# Patient Record
Sex: FEMALE | Race: BLACK OR AFRICAN AMERICAN | HISPANIC OR LATINO
[De-identification: names, ages, dates, MRNs, and addresses within clinical notes are randomized per-mention and may not be internally consistent; named-entity substitution may affect disease eponyms.]

---

## 2017-01-03 ENCOUNTER — APPOINTMENT (OUTPATIENT)
Dept: ANTEPARTUM | Facility: CLINIC | Age: 27
End: 2017-01-03

## 2017-01-03 ENCOUNTER — APPOINTMENT (OUTPATIENT)
Dept: OBGYN | Facility: CLINIC | Age: 27
End: 2017-01-03

## 2017-01-03 VITALS — WEIGHT: 268.02 LBS | BODY MASS INDEX: 49.32 KG/M2 | HEIGHT: 62 IN

## 2017-01-03 VITALS — DIASTOLIC BLOOD PRESSURE: 68 MMHG | BODY MASS INDEX: 49.02 KG/M2 | WEIGHT: 268 LBS | SYSTOLIC BLOOD PRESSURE: 114 MMHG

## 2017-01-03 VITALS — HEART RATE: 90 BPM | OXYGEN SATURATION: 100 %

## 2017-01-03 LAB
BILIRUB UR QL STRIP: NEGATIVE
CLARITY UR: CLEAR
COLLECTION METHOD: NORMAL
GLUCOSE UR-MCNC: NEGATIVE
HCG UR QL: 0.2 EU/DL
HGB UR QL STRIP.AUTO: NEGATIVE
KETONES UR-MCNC: NEGATIVE
LEUKOCYTE ESTERASE UR QL STRIP: NEGATIVE
NITRITE UR QL STRIP: NEGATIVE
PH UR STRIP: 6
PROT UR STRIP-MCNC: NEGATIVE
SP GR UR STRIP: 1.02

## 2017-01-10 ENCOUNTER — APPOINTMENT (OUTPATIENT)
Dept: OBGYN | Facility: CLINIC | Age: 27
End: 2017-01-10

## 2017-01-25 ENCOUNTER — APPOINTMENT (OUTPATIENT)
Dept: ANTEPARTUM | Facility: CLINIC | Age: 27
End: 2017-01-25

## 2017-01-25 VITALS — WEIGHT: 266.8 LBS | DIASTOLIC BLOOD PRESSURE: 74 MMHG | BODY MASS INDEX: 48.8 KG/M2 | SYSTOLIC BLOOD PRESSURE: 118 MMHG

## 2017-01-25 VITALS — TEMPERATURE: 97.7 F | HEART RATE: 82 BPM | OXYGEN SATURATION: 100 %

## 2017-01-25 LAB
BILIRUB UR QL STRIP: NEGATIVE
CLARITY UR: CLEAR
COLLECTION METHOD: NORMAL
GLUCOSE UR-MCNC: NEGATIVE
HCG UR QL: 0.2 EU/DL
HGB UR QL STRIP.AUTO: NEGATIVE
KETONES UR-MCNC: NORMAL
LEUKOCYTE ESTERASE UR QL STRIP: NEGATIVE
NITRITE UR QL STRIP: NEGATIVE
PH UR STRIP: 6
PROT UR STRIP-MCNC: NEGATIVE
SP GR UR STRIP: 1.01

## 2017-02-01 ENCOUNTER — APPOINTMENT (OUTPATIENT)
Dept: ANTEPARTUM | Facility: CLINIC | Age: 27
End: 2017-02-01

## 2017-02-01 VITALS — OXYGEN SATURATION: 99 % | TEMPERATURE: 97.9 F | HEART RATE: 84 BPM

## 2017-02-01 VITALS — WEIGHT: 263.1 LBS | BODY MASS INDEX: 48.12 KG/M2 | SYSTOLIC BLOOD PRESSURE: 120 MMHG | DIASTOLIC BLOOD PRESSURE: 72 MMHG

## 2017-02-01 LAB
BILIRUB UR QL STRIP: NEGATIVE
CLARITY UR: CLEAR
COLLECTION METHOD: NORMAL
GLUCOSE UR-MCNC: NEGATIVE
HGB UR QL STRIP.AUTO: NEGATIVE
KETONES UR-MCNC: NORMAL
LEUKOCYTE ESTERASE UR QL STRIP: NEGATIVE
NITRITE UR QL STRIP: NEGATIVE
PH UR STRIP: 6.5
SP GR UR STRIP: 1.02

## 2017-02-08 ENCOUNTER — APPOINTMENT (OUTPATIENT)
Age: 27
End: 2017-02-08

## 2017-02-08 ENCOUNTER — OUTPATIENT (OUTPATIENT)
Dept: OUTPATIENT SERVICES | Facility: HOSPITAL | Age: 27
LOS: 1 days | Discharge: HOME | End: 2017-02-08

## 2017-02-08 VITALS
BODY MASS INDEX: 48.29 KG/M2 | SYSTOLIC BLOOD PRESSURE: 108 MMHG | OXYGEN SATURATION: 99 % | TEMPERATURE: 97.7 F | DIASTOLIC BLOOD PRESSURE: 74 MMHG | WEIGHT: 264 LBS | HEART RATE: 86 BPM

## 2017-02-08 VITALS — BODY MASS INDEX: 48.29 KG/M2 | SYSTOLIC BLOOD PRESSURE: 108 MMHG | DIASTOLIC BLOOD PRESSURE: 74 MMHG | WEIGHT: 264 LBS

## 2017-02-08 LAB
BILIRUB UR QL STRIP: NEGATIVE
CLARITY UR: CLEAR
COLLECTION METHOD: NORMAL
GLUCOSE UR-MCNC: NEGATIVE
HCG UR QL: 0.2 EU/DL
HGB UR QL STRIP.AUTO: NEGATIVE
KETONES UR-MCNC: NEGATIVE
LEUKOCYTE ESTERASE UR QL STRIP: NEGATIVE
NITRITE UR QL STRIP: NEGATIVE
PH UR STRIP: 7
PROT UR STRIP-MCNC: NEGATIVE
SP GR UR STRIP: 1.02

## 2017-02-15 ENCOUNTER — APPOINTMENT (OUTPATIENT)
Dept: ANTEPARTUM | Facility: CLINIC | Age: 27
End: 2017-02-15

## 2017-02-15 VITALS — OXYGEN SATURATION: 100 % | TEMPERATURE: 98.4 F | HEART RATE: 88 BPM

## 2017-02-15 VITALS — BODY MASS INDEX: 48.65 KG/M2 | SYSTOLIC BLOOD PRESSURE: 124 MMHG | DIASTOLIC BLOOD PRESSURE: 80 MMHG | WEIGHT: 266 LBS

## 2017-02-15 LAB
BILIRUB UR QL STRIP: NEGATIVE
CLARITY UR: CLEAR
COLLECTION METHOD: NORMAL
GLUCOSE UR-MCNC: NORMAL
HCG UR QL: 0.2 EU/DL
HGB UR QL STRIP.AUTO: NEGATIVE
KETONES UR-MCNC: NEGATIVE
LEUKOCYTE ESTERASE UR QL STRIP: NEGATIVE
NITRITE UR QL STRIP: NEGATIVE
PH UR STRIP: 6.5
PROT UR STRIP-MCNC: NEGATIVE
SP GR UR STRIP: 1.02

## 2017-02-16 LAB
BASOPHILS # BLD: 0.01 TH/MM3
BASOPHILS NFR BLD: 0.2 %
EOSINOPHIL # BLD: 0.1 TH/MM3
EOSINOPHIL NFR BLD: 2.3 %
ERYTHROCYTE [DISTWIDTH] IN BLOOD BY AUTOMATED COUNT: 12.4 %
ESTIMATED AVERGAGE GLUCOSE (NORTH): 97 MG/DL
GLUCOSE 1H P GLC SERPL-MCNC: 87 MG/DL
GRANULOCYTES # BLD: 2.72 TH/MM3
GRANULOCYTES NFR BLD: 61.2 %
HBA1C MFR BLD: 5 %
HCT VFR BLD AUTO: 33.3 %
HGB BLD-MCNC: 10.7 G/DL
HIV1+2 AB SPEC QL IA.RAPID: NONREACTIVE
IMM GRANULOCYTES # BLD: 0.02 TH/MM3
IMM GRANULOCYTES NFR BLD: 0.5 %
LYMPHOCYTES # BLD: 1.11 TH/MM3
LYMPHOCYTES NFR BLD: 25 %
MCH RBC QN AUTO: 28.5 PG
MCHC RBC AUTO-ENTMCNC: 32.1 G/DL
MCV RBC AUTO: 88.8 FL
MONOCYTES # BLD: 0.48 TH/MM3
MONOCYTES NFR BLD: 10.8 %
PLATELET # BLD: 160 TH/MM3
PMV BLD AUTO: 11.6 FL
RBC # BLD AUTO: 3.75 MIL/MM3
WBC # BLD: 4.44 TH/MM3

## 2017-02-22 ENCOUNTER — APPOINTMENT (OUTPATIENT)
Dept: ANTEPARTUM | Facility: CLINIC | Age: 27
End: 2017-02-22

## 2017-02-22 VITALS — SYSTOLIC BLOOD PRESSURE: 124 MMHG | DIASTOLIC BLOOD PRESSURE: 76 MMHG | WEIGHT: 254.6 LBS | BODY MASS INDEX: 46.57 KG/M2

## 2017-02-22 VITALS — HEART RATE: 87 BPM | OXYGEN SATURATION: 100 % | TEMPERATURE: 97.6 F

## 2017-02-22 LAB
BILIRUB UR QL STRIP: NEGATIVE
CLARITY UR: CLEAR
COLLECTION METHOD: NORMAL
GLUCOSE UR-MCNC: NEGATIVE
HCG UR QL: 0.2 EU/DL
HGB UR QL STRIP.AUTO: NEGATIVE
KETONES UR-MCNC: NORMAL
LEUKOCYTE ESTERASE UR QL STRIP: NEGATIVE
NITRITE UR QL STRIP: NEGATIVE
PH UR STRIP: 6.5
PROT UR STRIP-MCNC: 30
SP GR UR STRIP: 1.02

## 2017-03-01 ENCOUNTER — APPOINTMENT (OUTPATIENT)
Dept: ANTEPARTUM | Facility: CLINIC | Age: 27
End: 2017-03-01

## 2017-03-01 VITALS — BODY MASS INDEX: 47.92 KG/M2 | DIASTOLIC BLOOD PRESSURE: 70 MMHG | SYSTOLIC BLOOD PRESSURE: 114 MMHG | WEIGHT: 262 LBS

## 2017-03-01 VITALS — HEART RATE: 90 BPM | TEMPERATURE: 97 F | OXYGEN SATURATION: 100 %

## 2017-03-08 ENCOUNTER — APPOINTMENT (OUTPATIENT)
Dept: ANTEPARTUM | Facility: CLINIC | Age: 27
End: 2017-03-08

## 2017-03-08 VITALS
DIASTOLIC BLOOD PRESSURE: 70 MMHG | TEMPERATURE: 98 F | HEART RATE: 88 BPM | BODY MASS INDEX: 48.65 KG/M2 | OXYGEN SATURATION: 100 % | WEIGHT: 266 LBS | SYSTOLIC BLOOD PRESSURE: 120 MMHG

## 2017-03-08 LAB
BILIRUB UR QL STRIP: NEGATIVE
CLARITY UR: CLEAR
COLLECTION METHOD: NORMAL
GLUCOSE UR-MCNC: NEGATIVE
HCG UR QL: 0.2 EU/DL
HGB UR QL STRIP.AUTO: NEGATIVE
KETONES UR-MCNC: NEGATIVE
LEUKOCYTE ESTERASE UR QL STRIP: NEGATIVE
NITRITE UR QL STRIP: NEGATIVE
PH UR STRIP: 8
PROT UR STRIP-MCNC: NEGATIVE
SP GR UR STRIP: 1.02

## 2017-03-15 ENCOUNTER — APPOINTMENT (OUTPATIENT)
Dept: ANTEPARTUM | Facility: CLINIC | Age: 27
End: 2017-03-15

## 2017-03-15 VITALS — OXYGEN SATURATION: 100 % | HEART RATE: 90 BPM | TEMPERATURE: 97 F

## 2017-03-15 VITALS — SYSTOLIC BLOOD PRESSURE: 120 MMHG | BODY MASS INDEX: 48.84 KG/M2 | DIASTOLIC BLOOD PRESSURE: 70 MMHG | WEIGHT: 267 LBS

## 2017-03-15 LAB
BILIRUB UR QL STRIP: NEGATIVE
CLARITY UR: CLEAR
COLLECTION METHOD: NORMAL
GLUCOSE UR-MCNC: NEGATIVE
HCG UR QL: 0.2 EU/DL
HGB UR QL STRIP.AUTO: NEGATIVE
KETONES UR-MCNC: NEGATIVE
LEUKOCYTE ESTERASE UR QL STRIP: NEGATIVE
NITRITE UR QL STRIP: NEGATIVE
PH UR STRIP: 6.5
PROT UR STRIP-MCNC: NEGATIVE
SP GR UR STRIP: 1.02

## 2017-03-17 LAB
GP B STREP DNA SPEC QL NAA+PROBE: NORMAL
S PYO DNA THROAT QL NAA+PROBE: NOT DETECTED
SPECIMEN SOURCE: NORMAL

## 2017-03-22 ENCOUNTER — APPOINTMENT (OUTPATIENT)
Dept: ANTEPARTUM | Facility: CLINIC | Age: 27
End: 2017-03-22

## 2017-03-22 VITALS — HEART RATE: 88 BPM | TEMPERATURE: 98.8 F | OXYGEN SATURATION: 100 %

## 2017-03-22 VITALS — SYSTOLIC BLOOD PRESSURE: 122 MMHG | DIASTOLIC BLOOD PRESSURE: 76 MMHG | BODY MASS INDEX: 48.32 KG/M2 | WEIGHT: 264.2 LBS

## 2017-03-22 LAB
BILIRUB UR QL STRIP: NEGATIVE
CLARITY UR: CLEAR
COLLECTION METHOD: NORMAL
GLUCOSE UR-MCNC: NEGATIVE
HCG UR QL: 0.2 EU/DL
HGB UR QL STRIP.AUTO: NEGATIVE
KETONES UR-MCNC: NEGATIVE
LEUKOCYTE ESTERASE UR QL STRIP: NEGATIVE
NITRITE UR QL STRIP: NEGATIVE
PH UR STRIP: 6.5
PROT UR STRIP-MCNC: NORMAL
SP GR UR STRIP: 1.02

## 2017-04-04 ENCOUNTER — APPOINTMENT (OUTPATIENT)
Dept: ANTEPARTUM | Facility: CLINIC | Age: 27
End: 2017-04-04

## 2017-04-04 VITALS — SYSTOLIC BLOOD PRESSURE: 124 MMHG | DIASTOLIC BLOOD PRESSURE: 90 MMHG | TEMPERATURE: 98.9 F

## 2017-04-11 ENCOUNTER — APPOINTMENT (OUTPATIENT)
Dept: ANTEPARTUM | Facility: CLINIC | Age: 27
End: 2017-04-11

## 2017-04-11 ENCOUNTER — APPOINTMENT (OUTPATIENT)
Dept: OBGYN | Facility: CLINIC | Age: 27
End: 2017-04-11

## 2017-05-01 ENCOUNTER — APPOINTMENT (OUTPATIENT)
Dept: ANTEPARTUM | Facility: CLINIC | Age: 27
End: 2017-05-01

## 2017-05-08 ENCOUNTER — OUTPATIENT (OUTPATIENT)
Dept: OUTPATIENT SERVICES | Facility: HOSPITAL | Age: 27
LOS: 1 days | Discharge: HOME | End: 2017-05-08

## 2017-05-08 ENCOUNTER — APPOINTMENT (OUTPATIENT)
Dept: ANTEPARTUM | Facility: CLINIC | Age: 27
End: 2017-05-08

## 2017-05-08 VITALS
HEART RATE: 65 BPM | BODY MASS INDEX: 46.38 KG/M2 | RESPIRATION RATE: 17 BRPM | OXYGEN SATURATION: 100 % | SYSTOLIC BLOOD PRESSURE: 120 MMHG | TEMPERATURE: 97.9 F | WEIGHT: 252 LBS | DIASTOLIC BLOOD PRESSURE: 85 MMHG | HEIGHT: 62 IN

## 2017-05-12 ENCOUNTER — APPOINTMENT (OUTPATIENT)
Dept: OBGYN | Facility: CLINIC | Age: 27
End: 2017-05-12

## 2017-06-02 ENCOUNTER — APPOINTMENT (OUTPATIENT)
Dept: OBGYN | Facility: CLINIC | Age: 27
End: 2017-06-02

## 2017-06-28 DIAGNOSIS — M32.9 SYSTEMIC LUPUS ERYTHEMATOSUS, UNSPECIFIED: ICD-10-CM

## 2017-07-14 DIAGNOSIS — Z3A.34 34 WEEKS GESTATION OF PREGNANCY: ICD-10-CM

## 2017-07-14 DIAGNOSIS — M32.9 SYSTEMIC LUPUS ERYTHEMATOSUS, UNSPECIFIED: ICD-10-CM

## 2018-01-22 ENCOUNTER — EMERGENCY (EMERGENCY)
Facility: HOSPITAL | Age: 28
LOS: 0 days | Discharge: HOME | End: 2018-01-23

## 2018-01-22 DIAGNOSIS — O99.89 OTHER SPECIFIED DISEASES AND CONDITIONS COMPLICATING PREGNANCY, CHILDBIRTH AND THE PUERPERIUM: ICD-10-CM

## 2018-01-22 DIAGNOSIS — G89.18 OTHER ACUTE POSTPROCEDURAL PAIN: ICD-10-CM

## 2018-01-22 DIAGNOSIS — R10.2 PELVIC AND PERINEAL PAIN: ICD-10-CM

## 2018-01-22 DIAGNOSIS — R06.02 SHORTNESS OF BREATH: ICD-10-CM

## 2018-01-22 DIAGNOSIS — R42 DIZZINESS AND GIDDINESS: ICD-10-CM

## 2018-01-22 DIAGNOSIS — R11.0 NAUSEA: ICD-10-CM

## 2018-01-22 DIAGNOSIS — N83.292 OTHER OVARIAN CYST, LEFT SIDE: ICD-10-CM

## 2018-02-01 ENCOUNTER — EMERGENCY (EMERGENCY)
Facility: HOSPITAL | Age: 28
LOS: 0 days | Discharge: HOME | End: 2018-02-01

## 2018-02-01 DIAGNOSIS — N99.820 POSTPROCEDURAL HEMORRHAGE OF A GENITOURINARY SYSTEM ORGAN OR STRUCTURE FOLLOWING A GENITOURINARY SYSTEM PROCEDURE: ICD-10-CM

## 2018-02-01 DIAGNOSIS — O99.89 OTHER SPECIFIED DISEASES AND CONDITIONS COMPLICATING PREGNANCY, CHILDBIRTH AND THE PUERPERIUM: ICD-10-CM

## 2018-02-01 DIAGNOSIS — Z79.2 LONG TERM (CURRENT) USE OF ANTIBIOTICS: ICD-10-CM

## 2018-02-01 DIAGNOSIS — N93.9 ABNORMAL UTERINE AND VAGINAL BLEEDING, UNSPECIFIED: ICD-10-CM

## 2018-02-07 ENCOUNTER — APPOINTMENT (OUTPATIENT)
Dept: OBGYN | Facility: CLINIC | Age: 28
End: 2018-02-07

## 2018-02-07 ENCOUNTER — OUTPATIENT (OUTPATIENT)
Dept: OUTPATIENT SERVICES | Facility: HOSPITAL | Age: 28
LOS: 1 days | Discharge: HOME | End: 2018-02-07

## 2018-02-07 VITALS
BODY MASS INDEX: 52.81 KG/M2 | WEIGHT: 287 LBS | SYSTOLIC BLOOD PRESSURE: 120 MMHG | HEIGHT: 62 IN | DIASTOLIC BLOOD PRESSURE: 80 MMHG

## 2018-02-13 ENCOUNTER — APPOINTMENT (OUTPATIENT)
Dept: OBGYN | Facility: CLINIC | Age: 28
End: 2018-02-13

## 2018-02-13 DIAGNOSIS — N83.209 UNSPECIFIED OVARIAN CYST, UNSPECIFIED SIDE: ICD-10-CM

## 2018-02-28 ENCOUNTER — APPOINTMENT (OUTPATIENT)
Dept: ANTEPARTUM | Facility: CLINIC | Age: 28
End: 2018-02-28

## 2018-05-04 ENCOUNTER — APPOINTMENT (OUTPATIENT)
Dept: INTERNAL MEDICINE | Facility: CLINIC | Age: 28
End: 2018-05-04

## 2019-01-14 ENCOUNTER — APPOINTMENT (OUTPATIENT)
Dept: INTERNAL MEDICINE | Facility: CLINIC | Age: 29
End: 2019-01-14

## 2019-07-03 ENCOUNTER — RESULT CHARGE (OUTPATIENT)
Age: 29
End: 2019-07-03

## 2019-07-03 ENCOUNTER — APPOINTMENT (OUTPATIENT)
Dept: ANTEPARTUM | Facility: CLINIC | Age: 29
End: 2019-07-03
Payer: MEDICAID

## 2019-07-03 ENCOUNTER — OUTPATIENT (OUTPATIENT)
Dept: OUTPATIENT SERVICES | Facility: HOSPITAL | Age: 29
LOS: 1 days | Discharge: HOME | End: 2019-07-03

## 2019-07-03 ENCOUNTER — NON-APPOINTMENT (OUTPATIENT)
Age: 29
End: 2019-07-03

## 2019-07-03 VITALS
OXYGEN SATURATION: 100 % | TEMPERATURE: 98.4 F | BODY MASS INDEX: 51.78 KG/M2 | DIASTOLIC BLOOD PRESSURE: 56 MMHG | SYSTOLIC BLOOD PRESSURE: 118 MMHG | WEIGHT: 283.13 LBS | HEART RATE: 71 BPM

## 2019-07-03 DIAGNOSIS — Z34.90 ENCOUNTER FOR SUPERVISION OF NORMAL PREGNANCY, UNSPECIFIED, UNSPECIFIED TRIMESTER: ICD-10-CM

## 2019-07-03 DIAGNOSIS — Z87.898 PERSONAL HISTORY OF OTHER SPECIFIED CONDITIONS: ICD-10-CM

## 2019-07-03 DIAGNOSIS — O09.90 SUPERVISION OF HIGH RISK PREGNANCY, UNSPECIFIED, UNSPECIFIED TRIMESTER: ICD-10-CM

## 2019-07-03 DIAGNOSIS — Z86.2 PERSONAL HISTORY OF DISEASES OF THE BLOOD AND BLOOD-FORMING ORGANS AND CERTAIN DISORDERS INVOLVING THE IMMUNE MECHANISM: ICD-10-CM

## 2019-07-03 DIAGNOSIS — M32.9 SYSTEMIC LUPUS ERYTHEMATOSUS, UNSPECIFIED: ICD-10-CM

## 2019-07-03 DIAGNOSIS — Z87.42 PERSONAL HISTORY OF OTHER DISEASES OF THE FEMALE GENITAL TRACT: ICD-10-CM

## 2019-07-03 DIAGNOSIS — Z87.19 PERSONAL HISTORY OF OTHER DISEASES OF THE DIGESTIVE SYSTEM: ICD-10-CM

## 2019-07-03 DIAGNOSIS — D64.9 ANEMIA, UNSPECIFIED: ICD-10-CM

## 2019-07-03 DIAGNOSIS — O99.311: ICD-10-CM

## 2019-07-03 LAB
BILIRUB UR QL STRIP: NEGATIVE
CLARITY UR: CLEAR
COLLECTION METHOD: NORMAL
GLUCOSE UR-MCNC: NEGATIVE
HCG UR QL: 0.2 EU/DL
HGB UR QL STRIP.AUTO: NEGATIVE
KETONES UR-MCNC: NORMAL
LEUKOCYTE ESTERASE UR QL STRIP: NEGATIVE
NITRITE UR QL STRIP: NEGATIVE
PH UR STRIP: 7
PROT UR STRIP-MCNC: NORMAL
SCHEDULED VISIT: YES
SP GR UR STRIP: 1.02
URINE ALBUMIN/PROTEIN: NORMAL
URINE GLUCOSE: NEGATIVE
URINE KETONES: NORMAL

## 2019-07-03 PROCEDURE — 99215 OFFICE O/P EST HI 40 MIN: CPT | Mod: 25

## 2019-07-03 PROCEDURE — 76801 OB US < 14 WKS SINGLE FETUS: CPT | Mod: 26

## 2019-07-03 RX ORDER — DOCUSATE SODIUM 100 MG/1
100 CAPSULE ORAL TWICE DAILY
Qty: 30 | Refills: 2 | Status: DISCONTINUED | COMMUNITY
Start: 2017-01-03 | End: 2019-07-03

## 2019-07-03 RX ORDER — NORGESTIMATE AND ETHINYL ESTRADIOL 7DAYSX3 LO
0.18/0.215/0.25 KIT ORAL DAILY
Qty: 30 | Refills: 6 | Status: DISCONTINUED | COMMUNITY
Start: 2017-05-08 | End: 2019-07-03

## 2019-07-03 RX ORDER — FERROUS SULFATE 325(65) MG
325 (65 FE) TABLET ORAL 3 TIMES DAILY
Qty: 90 | Refills: 0 | Status: DISCONTINUED | COMMUNITY
Start: 2017-02-15 | End: 2019-07-03

## 2019-07-03 RX ORDER — PNV NO.95/FERROUS FUM/FOLIC AC 28MG-0.8MG
27-0.8 TABLET ORAL DAILY
Qty: 30 | Refills: 0 | Status: DISCONTINUED | COMMUNITY
Start: 2017-02-15 | End: 2019-07-03

## 2019-07-03 RX ORDER — ONDANSETRON 4 MG/1
4 TABLET ORAL
Qty: 30 | Refills: 0 | Status: DISCONTINUED | COMMUNITY
Start: 2017-02-03 | End: 2019-07-03

## 2019-07-03 RX ORDER — PRENATAL VIT NO.130/IRON/FOLIC 27MG-0.8MG
28-0.8 TABLET ORAL DAILY
Qty: 30 | Refills: 5 | Status: ACTIVE | COMMUNITY
Start: 2019-07-03 | End: 1900-01-01

## 2019-07-03 RX ORDER — PNV NO.95/FERROUS FUM/FOLIC AC 28MG-0.8MG
TABLET ORAL
Refills: 0 | Status: ACTIVE | COMMUNITY
Start: 2019-07-03

## 2019-07-03 RX ORDER — DOCUSATE SODIUM 100 MG/1
100 CAPSULE ORAL 3 TIMES DAILY
Qty: 90 | Refills: 0 | Status: DISCONTINUED | COMMUNITY
Start: 2017-02-15 | End: 2019-07-03

## 2019-07-03 NOTE — OB HISTORY
[Pregnancy History] : patient did not receive anesthesia [___] : no pregnancy complications reported [FreeTextEntry1] : ETOP with D&C

## 2019-07-03 NOTE — HISTORY OF PRESENT ILLNESS
[FreeTextEntry1] : 27 y/o  at 8w3d by LMP 19, h/o SLE not on meds, here for initial prenatal vsiit. Patient diagnosed with lupus at age 17, was on plaquenil for 4 years. Has not been on medication and has not seen rheumatologist since. Reports her lupus manifestations consist of joint pain and occasionally skin rashes/ulcerations. Denies neurological issues, kidney problems in the past.

## 2019-07-03 NOTE — DISCUSSION/SUMMARY
[FreeTextEntry1] : 29 y/o  at 8w3d by LMP c/w first trimester sonogram today with EDC 2020, h/o lupus here for initial prenatal visit.\par \par # SLE\par - diagnosed age 17. First 4 years had active disease with joint pain and skin manifestations. Was on plaquenil for 4 years. \par - Since age 21 disease has been in remission. Occasionally gets joint pain or skin outbreaks. Reports that last pregnancy had only mild joint pain\par - currently not on meds\par - last pregnancy anti-SSA Abs positive. Per patient baby had no cardiac issues. Will repeat anti-SSA/SSB antibodies\par - fetal echo at 20 weeks\par - Discussed with patient that lupus occasionally worsens in pregnancy and the postpartum period. Risks in pregnancy include preeclampsia,  delivery, fetal loss, IUGR and  lupus. \par \par # pregnancy\par - early GCT (BMI>25 and  race)\par - sent for nuchal translucency and seq I\par - prenatal vitamins sent to pharmacy\par - sent for prenatal labs, GC/Cl and PAP\par \par RTC in 4 weeks\par

## 2019-07-03 NOTE — PHYSICAL EXAM
[Examination Of The Breasts] : normal [Examination Of The Lungs] : normal [Examination Of The Abdomen] : normal [Examination Of The Cardiovascular System] : normal [Peripheral Vascular Exam] : normal [Examination Of The Skin] : normal [Normal] : normal [___ weeks] : [unfilled] weeks

## 2019-07-05 LAB
C TRACH RRNA SPEC QL NAA+PROBE: NOT DETECTED
N GONORRHOEA RRNA SPEC QL NAA+PROBE: NOT DETECTED
SOURCE AMPLIFICATION: NORMAL

## 2019-07-08 DIAGNOSIS — O09.90 SUPERVISION OF HIGH RISK PREGNANCY, UNSPECIFIED, UNSPECIFIED TRIMESTER: ICD-10-CM

## 2019-07-08 DIAGNOSIS — Z3A.08 8 WEEKS GESTATION OF PREGNANCY: ICD-10-CM

## 2019-07-08 DIAGNOSIS — D64.9 ANEMIA, UNSPECIFIED: ICD-10-CM

## 2019-07-08 DIAGNOSIS — M32.9 SYSTEMIC LUPUS ERYTHEMATOSUS, UNSPECIFIED: ICD-10-CM

## 2019-07-31 ENCOUNTER — APPOINTMENT (OUTPATIENT)
Dept: ANTEPARTUM | Facility: CLINIC | Age: 29
End: 2019-07-31

## 2019-08-05 ENCOUNTER — LABORATORY RESULT (OUTPATIENT)
Age: 29
End: 2019-08-05

## 2019-08-06 ENCOUNTER — RESULT CHARGE (OUTPATIENT)
Age: 29
End: 2019-08-06

## 2019-08-06 ENCOUNTER — APPOINTMENT (OUTPATIENT)
Dept: ANTEPARTUM | Facility: CLINIC | Age: 29
End: 2019-08-06
Payer: MEDICAID

## 2019-08-06 ENCOUNTER — NON-APPOINTMENT (OUTPATIENT)
Age: 29
End: 2019-08-06

## 2019-08-06 ENCOUNTER — OUTPATIENT (OUTPATIENT)
Dept: OUTPATIENT SERVICES | Facility: HOSPITAL | Age: 29
LOS: 1 days | Discharge: HOME | End: 2019-08-06

## 2019-08-06 VITALS
BODY MASS INDEX: 52.81 KG/M2 | DIASTOLIC BLOOD PRESSURE: 76 MMHG | OXYGEN SATURATION: 98 % | HEIGHT: 62 IN | SYSTOLIC BLOOD PRESSURE: 124 MMHG | HEART RATE: 77 BPM | WEIGHT: 287 LBS | TEMPERATURE: 98 F

## 2019-08-06 LAB
BILIRUB UR QL STRIP: NORMAL
CLARITY UR: CLEAR
COLLECTION METHOD: NORMAL
GLUCOSE UR-MCNC: NORMAL
HCG UR QL: 0.2 EU/DL
HGB UR QL STRIP.AUTO: NORMAL
KETONES UR-MCNC: NORMAL
LEUKOCYTE ESTERASE UR QL STRIP: NORMAL
NITRITE UR QL STRIP: NORMAL
PH UR STRIP: 5.5
PROT UR STRIP-MCNC: NORMAL
SP GR UR STRIP: 1.01

## 2019-08-06 PROCEDURE — ZZZZZ: CPT

## 2019-08-06 PROCEDURE — 76813 OB US NUCHAL MEAS 1 GEST: CPT | Mod: 26

## 2019-08-06 PROCEDURE — 99213 OFFICE O/P EST LOW 20 MIN: CPT | Mod: 25

## 2019-08-06 RX ORDER — ASPIRIN ENTERIC COATED TABLETS 81 MG 81 MG/1
81 TABLET, DELAYED RELEASE ORAL DAILY
Qty: 1 | Refills: 5 | Status: ACTIVE | COMMUNITY
Start: 2019-08-06 | End: 1900-01-01

## 2019-08-08 DIAGNOSIS — G56.00 CARPAL TUNNEL SYNDROME, UNSPECIFIED UPPER LIMB: ICD-10-CM

## 2019-08-08 DIAGNOSIS — E11.9 TYPE 2 DIABETES MELLITUS WITHOUT COMPLICATIONS: ICD-10-CM

## 2019-08-08 LAB
CREAT 24H UR-MCNC: 0.9 G/24 HR
CREAT ?TM UR-MCNC: 126 MG/DL
PROT 24H UR-MRATE: 21 MG/DL
PROT ?TM UR-MCNC: 24 HR
PROT UR-MCNC: 158 MG/24 H
SPECIMEN VOL 24H UR: 750 ML

## 2019-08-09 DIAGNOSIS — O99.210 OBESITY COMPLICATING PREGNANCY, UNSPECIFIED TRIMESTER: ICD-10-CM

## 2019-08-09 DIAGNOSIS — O99.89 OTHER SPECIFIED DISEASES AND CONDITIONS COMPLICATING PREGNANCY, CHILDBIRTH AND THE PUERPERIUM: ICD-10-CM

## 2019-08-09 DIAGNOSIS — Z36.82 ENCOUNTER FOR ANTENATAL SCREENING FOR NUCHAL TRANSLUCENCY: ICD-10-CM

## 2019-08-09 DIAGNOSIS — Z3A.13 13 WEEKS GESTATION OF PREGNANCY: ICD-10-CM

## 2019-08-14 LAB
ABO + RH PNL BLD: NORMAL
APPEARANCE: CLEAR
BACTERIA UR CULT: NORMAL
BASOPHILS # BLD AUTO: 0.01 K/UL
BASOPHILS NFR BLD AUTO: 0.2 %
BILIRUBIN URINE: NEGATIVE
BLD GP AB SCN SERPL QL: NORMAL
BLOOD URINE: NEGATIVE
COLOR: YELLOW
ENA SS-A AB SER IA-ACNC: 2.3 AL
ENA SS-B AB SER IA-ACNC: <0.2 AL
EOSINOPHIL # BLD AUTO: 0.07 K/UL
EOSINOPHIL NFR BLD AUTO: 1.6 %
GLUCOSE 1H P 100 G GLC PO SERPL-MCNC: 95 MG/DL
GLUCOSE QUALITATIVE U: NEGATIVE
HBV SURFACE AG SER QL: NONREACTIVE
HCT VFR BLD CALC: 35.2 %
HGB A MFR BLD: 97.5 %
HGB A2 MFR BLD: 2.5 %
HGB BLD-MCNC: 11.5 G/DL
HGB FRACT BLD-IMP: NORMAL
HIV1+2 AB SPEC QL IA.RAPID: NONREACTIVE
IMM GRANULOCYTES NFR BLD AUTO: 0.7 %
KETONES URINE: NEGATIVE
LEAD BLD-MCNC: <1 UG/DL
LEUKOCYTE ESTERASE URINE: NEGATIVE
LYMPHOCYTES # BLD AUTO: 0.95 K/UL
LYMPHOCYTES NFR BLD AUTO: 21.3 %
MAN DIFF?: NORMAL
MCHC RBC-ENTMCNC: 28.5 PG
MCHC RBC-ENTMCNC: 32.7 G/DL
MCV RBC AUTO: 87.3 FL
MEV IGG FLD QL IA: <5 AU/ML
MEV IGG+IGM SER-IMP: NEGATIVE
MONOCYTES # BLD AUTO: 0.37 K/UL
MONOCYTES NFR BLD AUTO: 8.3 %
NEUTROPHILS # BLD AUTO: 3.03 K/UL
NEUTROPHILS NFR BLD AUTO: 67.9 %
NITRITE URINE: NEGATIVE
PH URINE: 6
PLATELET # BLD AUTO: 195 K/UL
PROTEIN URINE: NORMAL
RBC # BLD: 4.03 M/UL
RBC # FLD: 13.2 %
RUBV IGG FLD-ACNC: 1.1 INDEX
RUBV IGG SER-IMP: POSITIVE
SPECIFIC GRAVITY URINE: 1.03
T PALLIDUM AB SER QL IA: NEGATIVE
UROBILINOGEN URINE: NORMAL
VZV AB TITR SER: POSITIVE
VZV IGG SER IF-ACNC: 1216 INDEX
WBC # FLD AUTO: 4.46 K/UL

## 2019-08-21 LAB
AR GENE MUT ANL BLD/T: NEGATIVE
CFTR MUT TESTED BLD/T: NEGATIVE
FMR1 GENE MUT ANL BLD/T: NORMAL

## 2019-09-04 ENCOUNTER — APPOINTMENT (OUTPATIENT)
Dept: ANTEPARTUM | Facility: CLINIC | Age: 29
End: 2019-09-04

## 2019-09-16 ENCOUNTER — MESSAGE (OUTPATIENT)
Age: 29
End: 2019-09-16

## 2019-09-24 ENCOUNTER — APPOINTMENT (OUTPATIENT)
Dept: ANTEPARTUM | Facility: CLINIC | Age: 29
End: 2019-09-24

## 2019-10-07 ENCOUNTER — MESSAGE (OUTPATIENT)
Age: 29
End: 2019-10-07

## 2021-07-05 ENCOUNTER — INPATIENT (INPATIENT)
Facility: HOSPITAL | Age: 31
LOS: 6 days | Discharge: HOME HEALTH | End: 2021-07-12
Attending: PLASTIC SURGERY | Admitting: PLASTIC SURGERY
Payer: MEDICAID

## 2021-07-05 VITALS
SYSTOLIC BLOOD PRESSURE: 133 MMHG | WEIGHT: 293 LBS | OXYGEN SATURATION: 100 % | RESPIRATION RATE: 18 BRPM | HEIGHT: 62 IN | TEMPERATURE: 99 F | HEART RATE: 81 BPM | DIASTOLIC BLOOD PRESSURE: 74 MMHG

## 2021-07-05 DIAGNOSIS — L02.413 CUTANEOUS ABSCESS OF RIGHT UPPER LIMB: ICD-10-CM

## 2021-07-05 DIAGNOSIS — A41.9 SEPSIS, UNSPECIFIED ORGANISM: ICD-10-CM

## 2021-07-05 DIAGNOSIS — I96 GANGRENE, NOT ELSEWHERE CLASSIFIED: ICD-10-CM

## 2021-07-05 DIAGNOSIS — M32.9 SYSTEMIC LUPUS ERYTHEMATOSUS, UNSPECIFIED: ICD-10-CM

## 2021-07-05 DIAGNOSIS — L03.113 CELLULITIS OF RIGHT UPPER LIMB: ICD-10-CM

## 2021-07-05 DIAGNOSIS — E66.9 OBESITY, UNSPECIFIED: ICD-10-CM

## 2021-07-05 LAB
ALBUMIN SERPL ELPH-MCNC: 4.3 G/DL — SIGNIFICANT CHANGE UP (ref 3.5–5.2)
ALP SERPL-CCNC: 69 U/L — SIGNIFICANT CHANGE UP (ref 30–115)
ALT FLD-CCNC: 21 U/L — SIGNIFICANT CHANGE UP (ref 0–41)
ANION GAP SERPL CALC-SCNC: 15 MMOL/L — HIGH (ref 7–14)
AST SERPL-CCNC: 17 U/L — SIGNIFICANT CHANGE UP (ref 0–41)
BASOPHILS # BLD AUTO: 0.03 K/UL — SIGNIFICANT CHANGE UP (ref 0–0.2)
BASOPHILS NFR BLD AUTO: 0.2 % — SIGNIFICANT CHANGE UP (ref 0–1)
BILIRUB SERPL-MCNC: 1.4 MG/DL — HIGH (ref 0.2–1.2)
BUN SERPL-MCNC: 11 MG/DL — SIGNIFICANT CHANGE UP (ref 10–20)
CALCIUM SERPL-MCNC: 9.1 MG/DL — SIGNIFICANT CHANGE UP (ref 8.5–10.1)
CHLORIDE SERPL-SCNC: 98 MMOL/L — SIGNIFICANT CHANGE UP (ref 98–110)
CO2 SERPL-SCNC: 23 MMOL/L — SIGNIFICANT CHANGE UP (ref 17–32)
CREAT SERPL-MCNC: 0.9 MG/DL — SIGNIFICANT CHANGE UP (ref 0.7–1.5)
EOSINOPHIL # BLD AUTO: 0.04 K/UL — SIGNIFICANT CHANGE UP (ref 0–0.7)
EOSINOPHIL NFR BLD AUTO: 0.3 % — SIGNIFICANT CHANGE UP (ref 0–8)
GLUCOSE SERPL-MCNC: 88 MG/DL — SIGNIFICANT CHANGE UP (ref 70–99)
HCG SERPL QL: NEGATIVE — SIGNIFICANT CHANGE UP
HCT VFR BLD CALC: 36.3 % — LOW (ref 37–47)
HGB BLD-MCNC: 11.8 G/DL — LOW (ref 12–16)
IMM GRANULOCYTES NFR BLD AUTO: 1.7 % — HIGH (ref 0.1–0.3)
LACTATE SERPL-SCNC: 1.1 MMOL/L — SIGNIFICANT CHANGE UP (ref 0.7–2)
LYMPHOCYTES # BLD AUTO: 1.59 K/UL — SIGNIFICANT CHANGE UP (ref 1.2–3.4)
LYMPHOCYTES # BLD AUTO: 11.8 % — LOW (ref 20.5–51.1)
MCHC RBC-ENTMCNC: 29.2 PG — SIGNIFICANT CHANGE UP (ref 27–31)
MCHC RBC-ENTMCNC: 32.5 G/DL — SIGNIFICANT CHANGE UP (ref 32–37)
MCV RBC AUTO: 89.9 FL — SIGNIFICANT CHANGE UP (ref 81–99)
MONOCYTES # BLD AUTO: 1.07 K/UL — HIGH (ref 0.1–0.6)
MONOCYTES NFR BLD AUTO: 8 % — SIGNIFICANT CHANGE UP (ref 1.7–9.3)
NEUTROPHILS # BLD AUTO: 10.48 K/UL — HIGH (ref 1.4–6.5)
NEUTROPHILS NFR BLD AUTO: 78 % — HIGH (ref 42.2–75.2)
NRBC # BLD: 0 /100 WBCS — SIGNIFICANT CHANGE UP (ref 0–0)
PLATELET # BLD AUTO: 186 K/UL — SIGNIFICANT CHANGE UP (ref 130–400)
POTASSIUM SERPL-MCNC: 3.3 MMOL/L — LOW (ref 3.5–5)
POTASSIUM SERPL-SCNC: 3.3 MMOL/L — LOW (ref 3.5–5)
PROT SERPL-MCNC: 7.4 G/DL — SIGNIFICANT CHANGE UP (ref 6–8)
RBC # BLD: 4.04 M/UL — LOW (ref 4.2–5.4)
RBC # FLD: 13.6 % — SIGNIFICANT CHANGE UP (ref 11.5–14.5)
SODIUM SERPL-SCNC: 136 MMOL/L — SIGNIFICANT CHANGE UP (ref 135–146)
WBC # BLD: 13.44 K/UL — HIGH (ref 4.8–10.8)
WBC # FLD AUTO: 13.44 K/UL — HIGH (ref 4.8–10.8)

## 2021-07-05 PROCEDURE — 76882 US LMTD JT/FCL EVL NVASC XTR: CPT | Mod: 26

## 2021-07-05 PROCEDURE — 99285 EMERGENCY DEPT VISIT HI MDM: CPT | Mod: 25

## 2021-07-05 RX ORDER — AMPICILLIN SODIUM AND SULBACTAM SODIUM 250; 125 MG/ML; MG/ML
3 INJECTION, POWDER, FOR SUSPENSION INTRAMUSCULAR; INTRAVENOUS ONCE
Refills: 0 | Status: COMPLETED | OUTPATIENT
Start: 2021-07-05 | End: 2021-07-05

## 2021-07-05 RX ORDER — POTASSIUM CHLORIDE 20 MEQ
40 PACKET (EA) ORAL ONCE
Refills: 0 | Status: COMPLETED | OUTPATIENT
Start: 2021-07-05 | End: 2021-07-05

## 2021-07-05 RX ADMIN — AMPICILLIN SODIUM AND SULBACTAM SODIUM 200 GRAM(S): 250; 125 INJECTION, POWDER, FOR SUSPENSION INTRAMUSCULAR; INTRAVENOUS at 22:32

## 2021-07-06 LAB
ALBUMIN SERPL ELPH-MCNC: 3.9 G/DL — SIGNIFICANT CHANGE UP (ref 3.5–5.2)
ALP SERPL-CCNC: 57 U/L — SIGNIFICANT CHANGE UP (ref 30–115)
ALT FLD-CCNC: 17 U/L — SIGNIFICANT CHANGE UP (ref 0–41)
ANION GAP SERPL CALC-SCNC: 12 MMOL/L — SIGNIFICANT CHANGE UP (ref 7–14)
AST SERPL-CCNC: 14 U/L — SIGNIFICANT CHANGE UP (ref 0–41)
BASOPHILS # BLD AUTO: 0 K/UL — SIGNIFICANT CHANGE UP (ref 0–0.2)
BASOPHILS NFR BLD AUTO: 0 % — SIGNIFICANT CHANGE UP (ref 0–1)
BILIRUB DIRECT SERPL-MCNC: 0.5 MG/DL — HIGH (ref 0–0.2)
BILIRUB INDIRECT FLD-MCNC: 0.7 MG/DL — SIGNIFICANT CHANGE UP (ref 0.2–1.2)
BILIRUB SERPL-MCNC: 1.2 MG/DL — SIGNIFICANT CHANGE UP (ref 0.2–1.2)
BLD GP AB SCN SERPL QL: SIGNIFICANT CHANGE UP
BUN SERPL-MCNC: 10 MG/DL — SIGNIFICANT CHANGE UP (ref 10–20)
CALCIUM SERPL-MCNC: 8.8 MG/DL — SIGNIFICANT CHANGE UP (ref 8.5–10.1)
CHLORIDE SERPL-SCNC: 103 MMOL/L — SIGNIFICANT CHANGE UP (ref 98–110)
CO2 SERPL-SCNC: 22 MMOL/L — SIGNIFICANT CHANGE UP (ref 17–32)
CREAT SERPL-MCNC: 0.9 MG/DL — SIGNIFICANT CHANGE UP (ref 0.7–1.5)
EOSINOPHIL # BLD AUTO: 0.09 K/UL — SIGNIFICANT CHANGE UP (ref 0–0.7)
EOSINOPHIL NFR BLD AUTO: 0.9 % — SIGNIFICANT CHANGE UP (ref 0–8)
GIANT PLATELETS BLD QL SMEAR: PRESENT — SIGNIFICANT CHANGE UP
GLUCOSE SERPL-MCNC: 95 MG/DL — SIGNIFICANT CHANGE UP (ref 70–99)
GRAM STN FLD: SIGNIFICANT CHANGE UP
HCG UR QL: NEGATIVE — SIGNIFICANT CHANGE UP
HCT VFR BLD CALC: 32.5 % — LOW (ref 37–47)
HGB BLD-MCNC: 10.3 G/DL — LOW (ref 12–16)
LYMPHOCYTES # BLD AUTO: 1.15 K/UL — LOW (ref 1.2–3.4)
LYMPHOCYTES # BLD AUTO: 11.3 % — LOW (ref 20.5–51.1)
MAGNESIUM SERPL-MCNC: 1.9 MG/DL — SIGNIFICANT CHANGE UP (ref 1.8–2.4)
MANUAL SMEAR VERIFICATION: SIGNIFICANT CHANGE UP
MCHC RBC-ENTMCNC: 28.5 PG — SIGNIFICANT CHANGE UP (ref 27–31)
MCHC RBC-ENTMCNC: 31.7 G/DL — LOW (ref 32–37)
MCV RBC AUTO: 89.8 FL — SIGNIFICANT CHANGE UP (ref 81–99)
MONOCYTES # BLD AUTO: 0.62 K/UL — HIGH (ref 0.1–0.6)
MONOCYTES NFR BLD AUTO: 6.1 % — SIGNIFICANT CHANGE UP (ref 1.7–9.3)
NEUTROPHILS # BLD AUTO: 8.3 K/UL — HIGH (ref 1.4–6.5)
NEUTROPHILS NFR BLD AUTO: 81.7 % — HIGH (ref 42.2–75.2)
PHOSPHATE SERPL-MCNC: 3 MG/DL — SIGNIFICANT CHANGE UP (ref 2.1–4.9)
PLAT MORPH BLD: NORMAL — SIGNIFICANT CHANGE UP
PLATELET # BLD AUTO: 165 K/UL — SIGNIFICANT CHANGE UP (ref 130–400)
POLYCHROMASIA BLD QL SMEAR: SIGNIFICANT CHANGE UP
POTASSIUM SERPL-MCNC: 3.8 MMOL/L — SIGNIFICANT CHANGE UP (ref 3.5–5)
POTASSIUM SERPL-SCNC: 3.8 MMOL/L — SIGNIFICANT CHANGE UP (ref 3.5–5)
PROT SERPL-MCNC: 6.5 G/DL — SIGNIFICANT CHANGE UP (ref 6–8)
RBC # BLD: 3.62 M/UL — LOW (ref 4.2–5.4)
RBC # FLD: 13.5 % — SIGNIFICANT CHANGE UP (ref 11.5–14.5)
RBC BLD AUTO: NORMAL — SIGNIFICANT CHANGE UP
SARS-COV-2 RNA SPEC QL NAA+PROBE: SIGNIFICANT CHANGE UP
SODIUM SERPL-SCNC: 137 MMOL/L — SIGNIFICANT CHANGE UP (ref 135–146)
SPECIMEN SOURCE: SIGNIFICANT CHANGE UP
WBC # BLD: 10.16 K/UL — SIGNIFICANT CHANGE UP (ref 4.8–10.8)
WBC # FLD AUTO: 10.16 K/UL — SIGNIFICANT CHANGE UP (ref 4.8–10.8)

## 2021-07-06 PROCEDURE — 93010 ELECTROCARDIOGRAM REPORT: CPT

## 2021-07-06 PROCEDURE — 71045 X-RAY EXAM CHEST 1 VIEW: CPT | Mod: 26

## 2021-07-06 PROCEDURE — 99223 1ST HOSP IP/OBS HIGH 75: CPT

## 2021-07-06 PROCEDURE — 73201 CT UPPER EXTREMITY W/DYE: CPT | Mod: 26,RT

## 2021-07-06 PROCEDURE — 99221 1ST HOSP IP/OBS SF/LOW 40: CPT

## 2021-07-06 PROCEDURE — 73060 X-RAY EXAM OF HUMERUS: CPT | Mod: 26,RT

## 2021-07-06 RX ORDER — KETOROLAC TROMETHAMINE 30 MG/ML
30 SYRINGE (ML) INJECTION ONCE
Refills: 0 | Status: DISCONTINUED | OUTPATIENT
Start: 2021-07-06 | End: 2021-07-06

## 2021-07-06 RX ORDER — VANCOMYCIN HCL 1 G
2000 VIAL (EA) INTRAVENOUS ONCE
Refills: 0 | Status: COMPLETED | OUTPATIENT
Start: 2021-07-06 | End: 2021-07-06

## 2021-07-06 RX ORDER — CHLORHEXIDINE GLUCONATE 213 G/1000ML
1 SOLUTION TOPICAL
Refills: 0 | Status: DISCONTINUED | OUTPATIENT
Start: 2021-07-06 | End: 2021-07-07

## 2021-07-06 RX ORDER — VANCOMYCIN HCL 1 G
2000 VIAL (EA) INTRAVENOUS EVERY 12 HOURS
Refills: 0 | Status: DISCONTINUED | OUTPATIENT
Start: 2021-07-06 | End: 2021-07-07

## 2021-07-06 RX ORDER — ENOXAPARIN SODIUM 100 MG/ML
40 INJECTION SUBCUTANEOUS DAILY
Refills: 0 | Status: DISCONTINUED | OUTPATIENT
Start: 2021-07-06 | End: 2021-07-07

## 2021-07-06 RX ORDER — VANCOMYCIN HCL 1 G
VIAL (EA) INTRAVENOUS
Refills: 0 | Status: DISCONTINUED | OUTPATIENT
Start: 2021-07-06 | End: 2021-07-07

## 2021-07-06 RX ORDER — ACETAMINOPHEN 500 MG
650 TABLET ORAL EVERY 6 HOURS
Refills: 0 | Status: DISCONTINUED | OUTPATIENT
Start: 2021-07-06 | End: 2021-07-07

## 2021-07-06 RX ADMIN — Medication 250 MILLIGRAM(S): at 17:17

## 2021-07-06 RX ADMIN — Medication 30 MILLIGRAM(S): at 23:15

## 2021-07-06 RX ADMIN — ENOXAPARIN SODIUM 40 MILLIGRAM(S): 100 INJECTION SUBCUTANEOUS at 12:00

## 2021-07-06 RX ADMIN — Medication 30 MILLIGRAM(S): at 04:25

## 2021-07-06 RX ADMIN — Medication 30 MILLIGRAM(S): at 22:28

## 2021-07-06 RX ADMIN — Medication 650 MILLIGRAM(S): at 14:41

## 2021-07-06 RX ADMIN — Medication 40 MILLIEQUIVALENT(S): at 01:20

## 2021-07-06 RX ADMIN — Medication 250 MILLIGRAM(S): at 05:39

## 2021-07-06 NOTE — ED PROVIDER NOTE - CLINICAL SUMMARY MEDICAL DECISION MAKING FREE TEXT BOX
RUE abscess & cellulitis w/systemic sx of inf, site currently self-draining - xr no free air, bedside US no FF collection - iv abx, admitted for further mgmt

## 2021-07-06 NOTE — ED PROVIDER NOTE - NS ED ROS FT
Review of Systems    Constitutional: (+) fever   Cardiovascular: (-) chest pain, (-) syncope (-) palpitations  Respiratory: (-) cough, (-) shortness of breath  Gastrointestinal: (-) vomiting, (-) diarrhea (-) abdominal pain  Genitourinary:  (-) dysuria   Musculoskeletal: (-) neck pain, (-) back pain, (-) leg pain/swelling  Integumentary: see hpi   Neurological: (-) headache, (-) confusion  Hematologic: (-) easy bruising

## 2021-07-06 NOTE — CONSULT NOTE ADULT - ASSESSMENT
ASSESSMENT  30F w/ SLE who prsents with RUE pain for 4 days        IMPRESSION  #Sepsis on admission (Fevers, WBC>12) secondary to severe purulent soft tissue infection   - Xray Humerus, Right (07.06.21 @ 03:13): Soft tissue swelling without evidence for acute osseous abnormality.    #SLE in Remission   #Obesity BMI (kg/m2): 56.8  #Abx allergy: NKDA    RECOMMENDATIONS  This is a preliminary incomplete pended note, all final recommendations to follow after interview and examination of the patient.    Spectra [phone]     ASSESSMENT  30F w/ SLE who prsents with RUE pain for 4 days        IMPRESSION  #Sepsis on admission (Fevers, WBC>12) secondary to severe purulent soft tissue infection   - Xray Humerus, Right (07.06.21 @ 03:13): Soft tissue swelling without evidence for acute osseous abnormality.    #SLE in Remission   #Obesity BMI (kg/m2): 56.8  #Abx allergy: NKDA    RECOMMENDATIONS  - continue vancomycin 2g q 12 hours, check trough prior to 4th dose  - planned for OR by burn  - will follow-up cultures  - follow-up blood cultures    I will be away tomorrow and will be available on Thursday.   I will be unavailable by phone. Please message on Teams if with questions.  Spectra [phone]

## 2021-07-06 NOTE — ED PROCEDURE NOTE - PROCEDURE ADDITIONAL DETAILS
it should be noted that this US was done post-expression of pus by Dr. Arango, and was done to evaluate if there was necessity to incise area for further abscess drainage.

## 2021-07-06 NOTE — PRE-OP CHECKLIST - SELECT TESTS ORDERED
PT/PTT/INR/Type and Screen/EKG BMP/CBC/PT/PTT/INR/Type and Screen/EKG/CXR/COVID-19 CBC/CMP/PT/PTT/INR/Type and Screen/EKG/CXR/COVID-19

## 2021-07-06 NOTE — ED PROVIDER NOTE - PROGRESS NOTE DETAILS
RANDELL: Patient came in with swelling I intend to get a bedside soft tissue US-->evidence of cellulitis, no abscess (this is after Dr. Arango's evacuation of pus)

## 2021-07-06 NOTE — PRE-OP CHECKLIST - TO WHOM
Other (See Comments)     Muscle pain    Codeine Other (See Comments)     Feels jittery, hyper    Levaquin [Levofloxacin]     Other      clindamycin caused rash    Sulfa Antibiotics Rash    Vicodin [Hydrocodone-Acetaminophen] Anxiety      Identified Potential Medication Interactions:     No clinically significant interactions identified via [employer] Interaction Analysis as category D or higher. Renal Dosing:     Estimated Creatinine Clearance: 55 mL/min (based on SCr of 0.7 mg/dL). eGFR: >60 mL/min/1.73 m^2   No renal adjustments necessary. ASSESSMENT/PLAN:     Medication reconciliation completed. No voiced concerns or questions. Home medication list updated to the best of the writer's ability.     · Spoke with patient's  and he requested I get updated medication list from [facility] as they have the patient's updated medication list.     Follow up appointment(s) and dates  Date Time Provider Brittany Peck   3/22/2018 2:00 PM MD Shimon Frazier   4/30/2018 10:20 AM MD DULCE Frazier, PharmD, [facility] Specialist  O: [phone]  C: [address] [place] [address]  [phone], Option 7    =======================================================    For Pharmacy Admin Tracking Only  TCM Call Made?: No  [facility] Select Patient?: Yes  Total # of Interventions Recommended: 1 - Updated Order #: 1  Total # Interventions Accepted: 1  Intervention Severity:   - Level 1 Intervention Present?: No   - Level 2 #: 0   - Level 3 #: 0  Outreach Status: Review Complete  Care Coordinator Outreach to Patient?: Yes  Provider Contacted?: No  Time Spent (min): 30 or rn or grant hunt

## 2021-07-06 NOTE — CONSULT NOTE ADULT - ASSESSMENT
RUE cellulitis with open wound     RECOMMENDATION:  Added on for OR today 7/6 (patient already NPO for CT scan)  Can continue with CT scan today ,but it is not required for surgery  IV abx as indicated/ per ID - will take cultures in OR if patient goes today  For preop - needs UPREG, EKG and CXR   Will follow.
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
## 2021-07-06 NOTE — ED ADULT NURSE NOTE - OBJECTIVE STATEMENT
pt is a 29 yo femlae pw postulated rash to right elbow starting 3 days ago, pt sts she thought it was a mosquito bite but it quickly grew in size.  pt c/o headache, fever, chills, body ache x2 days
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
## 2021-07-06 NOTE — H&P ADULT - ATTENDING COMMENTS
Patient seen and examined independently. Agree with resident note/ history / physical exam and plan of care with following exceptions/additions/updates. Case discussed with patient/pt decision maker, house-staff and nursing. My notes supersedes the resident's notes in case of discrepancies.    left upper ext swelling and open oozing wound. tender  burn consult  ct LUE  cont abx      #Progress Note Handoff  Pending (specify):  Consults_ burn Tests___ct LUE, _________  Family discussion: mindy pt fully aaox3  Disposition: Home___

## 2021-07-06 NOTE — ED ADULT NURSE NOTE - NSIMPLEMENTINTERV_GEN_ALL_ED
Implemented All Universal Safety Interventions:  [place] to call system. Call bell, personal items and telephone within reach. Instruct patient to call for assistance. Room bathroom lighting operational. Non-slip footwear when patient is off stretcher. Physically safe environment: no spills, clutter or unnecessary equipment. Stretcher in lowest position, wheels locked, appropriate side rails in place.
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
## 2021-07-06 NOTE — H&P ADULT - NSHPLABSRESULTS_GEN_ALL_CORE
Labs:                         11.8   13.44 )-----------( 186      ( 05 Jul 2021 21:59 )             36.3     Neutophil% 78.0, Lymphocyte% 11.8, Monocyte% 8.0, Bands% 1.7 07-05-21 @ 21:59    05 Jul 2021 21:59    136    |  98     |  11     ----------------------------<  88     3.3     |  23     |  0.9      Ca    9.1        05 Jul 2021 21:59    TPro  7.4    /  Alb  4.3    /  TBili  1.4    /  DBili  x      /  AST  17     /  ALT  21     /  AlkPhos  69     05 Jul 2021 21:59          COVID-19 PCR: Mani (07-06)        Radiology:
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
## 2021-07-06 NOTE — CONSULT NOTE ADULT - ATTENDING COMMENTS
Chart and history reviewed     EXAM :   pt awake alert , pleasant in NAD   RUE - dessicated open abrasion right elbow - with mild surrounding swelling and sl discoloration - no significant tenderness     Inner arm -significant swelling, edema, erythema and tenderness ~ 1cm open necrotic yellow wound probe attempted ; small amount of drainage noted on linen   No gross fluctuance     Discussed I & D with pt - Concerns addressed   She consents to procedure

## 2021-07-06 NOTE — H&P ADULT - NSHPPHYSICALEXAM_GEN_ALL_CORE
GENERAL: NAD, well-developed  HEAD:  Atraumatic, Normocephalic  EYES: EOMI, PERRLA, conjunctiva and sclera clear  NECK: Supple, No JVD  CHEST/LUNG: Clear to auscultation bilaterally; No wheeze  HEART: Regular rate and rhythm; No murmurs, rubs, or gallops  ABDOMEN: Soft, Nontender, Nondistended; Bowel sounds present  EXTREMITIES:  RUE induration, edema, erythema and purulence  PSYCH: AAOx3  NEUROLOGY: non-focal

## 2021-07-07 LAB
ALBUMIN SERPL ELPH-MCNC: 3.5 G/DL — SIGNIFICANT CHANGE UP (ref 3.5–5.2)
ALP SERPL-CCNC: 62 U/L — SIGNIFICANT CHANGE UP (ref 30–115)
ALT FLD-CCNC: 19 U/L — SIGNIFICANT CHANGE UP (ref 0–41)
ANION GAP SERPL CALC-SCNC: 12 MMOL/L — SIGNIFICANT CHANGE UP (ref 7–14)
APTT BLD: 30.3 SEC — SIGNIFICANT CHANGE UP (ref 27–39.2)
AST SERPL-CCNC: 22 U/L — SIGNIFICANT CHANGE UP (ref 0–41)
BASOPHILS # BLD AUTO: 0.02 K/UL — SIGNIFICANT CHANGE UP (ref 0–0.2)
BASOPHILS NFR BLD AUTO: 0.2 % — SIGNIFICANT CHANGE UP (ref 0–1)
BILIRUB SERPL-MCNC: 0.7 MG/DL — SIGNIFICANT CHANGE UP (ref 0.2–1.2)
BLD GP AB SCN SERPL QL: SIGNIFICANT CHANGE UP
BUN SERPL-MCNC: 12 MG/DL — SIGNIFICANT CHANGE UP (ref 10–20)
BUN SERPL-MCNC: 13 MG/DL — SIGNIFICANT CHANGE UP (ref 10–20)
CALCIUM SERPL-MCNC: 8.6 MG/DL — SIGNIFICANT CHANGE UP (ref 8.5–10.1)
CALCIUM SERPL-MCNC: 9.2 MG/DL — SIGNIFICANT CHANGE UP (ref 8.5–10.1)
CHLORIDE SERPL-SCNC: 102 MMOL/L — SIGNIFICANT CHANGE UP (ref 98–110)
CO2 SERPL-SCNC: 22 MMOL/L — SIGNIFICANT CHANGE UP (ref 17–32)
CO2 SERPL-SCNC: 22 MMOL/L — SIGNIFICANT CHANGE UP (ref 17–32)
COVID-19 SPIKE DOMAIN AB INTERP: POSITIVE
COVID-19 SPIKE DOMAIN ANTIBODY RESULT: >250 U/ML — HIGH
CREAT SERPL-MCNC: 0.7 MG/DL — SIGNIFICANT CHANGE UP (ref 0.7–1.5)
CREAT SERPL-MCNC: 0.7 MG/DL — SIGNIFICANT CHANGE UP (ref 0.7–1.5)
EOSINOPHIL # BLD AUTO: 0 K/UL — SIGNIFICANT CHANGE UP (ref 0–0.7)
EOSINOPHIL NFR BLD AUTO: 0 % — SIGNIFICANT CHANGE UP (ref 0–8)
GLUCOSE SERPL-MCNC: 119 MG/DL — HIGH (ref 70–99)
GLUCOSE SERPL-MCNC: 134 MG/DL — HIGH (ref 70–99)
HCT VFR BLD CALC: 33.2 % — LOW (ref 37–47)
HCT VFR BLD CALC: 33.6 % — LOW (ref 37–47)
HGB BLD-MCNC: 10.7 G/DL — LOW (ref 12–16)
HGB BLD-MCNC: 11 G/DL — LOW (ref 12–16)
IMM GRANULOCYTES NFR BLD AUTO: 1.4 % — HIGH (ref 0.1–0.3)
INR BLD: 1.32 RATIO — HIGH (ref 0.65–1.3)
LYMPHOCYTES # BLD AUTO: 1.01 K/UL — LOW (ref 1.2–3.4)
LYMPHOCYTES # BLD AUTO: 10 % — LOW (ref 20.5–51.1)
MAGNESIUM SERPL-MCNC: 2.1 MG/DL — SIGNIFICANT CHANGE UP (ref 1.8–2.4)
MCHC RBC-ENTMCNC: 28.5 PG — SIGNIFICANT CHANGE UP (ref 27–31)
MCHC RBC-ENTMCNC: 29.7 PG — SIGNIFICANT CHANGE UP (ref 27–31)
MCHC RBC-ENTMCNC: 31.8 G/DL — LOW (ref 32–37)
MCHC RBC-ENTMCNC: 33.1 G/DL — SIGNIFICANT CHANGE UP (ref 32–37)
MCV RBC AUTO: 89.6 FL — SIGNIFICANT CHANGE UP (ref 81–99)
MCV RBC AUTO: 89.7 FL — SIGNIFICANT CHANGE UP (ref 81–99)
METHOD TYPE: SIGNIFICANT CHANGE UP
MONOCYTES # BLD AUTO: 0.58 K/UL — SIGNIFICANT CHANGE UP (ref 0.1–0.6)
MONOCYTES NFR BLD AUTO: 5.7 % — SIGNIFICANT CHANGE UP (ref 1.7–9.3)
MRSA PCR RESULT.: POSITIVE
NEUTROPHILS # BLD AUTO: 8.39 K/UL — HIGH (ref 1.4–6.5)
NEUTROPHILS NFR BLD AUTO: 82.7 % — HIGH (ref 42.2–75.2)
NRBC # BLD: 0 /100 WBCS — SIGNIFICANT CHANGE UP (ref 0–0)
NRBC # BLD: 0 /100 WBCS — SIGNIFICANT CHANGE UP (ref 0–0)
PHOSPHATE SERPL-MCNC: 4.1 MG/DL — SIGNIFICANT CHANGE UP (ref 2.1–4.9)
PLATELET # BLD AUTO: 159 K/UL — SIGNIFICANT CHANGE UP (ref 130–400)
PLATELET # BLD AUTO: 235 K/UL — SIGNIFICANT CHANGE UP (ref 130–400)
POTASSIUM SERPL-MCNC: 3.3 MMOL/L — LOW (ref 3.5–5)
POTASSIUM SERPL-SCNC: 3.3 MMOL/L — LOW (ref 3.5–5)
PROT SERPL-MCNC: 6.3 G/DL — SIGNIFICANT CHANGE UP (ref 6–8)
PROTHROM AB SERPL-ACNC: 15.2 SEC — HIGH (ref 9.95–12.87)
RBC # BLD: 3.7 M/UL — LOW (ref 4.2–5.4)
RBC # BLD: 3.75 M/UL — LOW (ref 4.2–5.4)
RBC # FLD: 13.3 % — SIGNIFICANT CHANGE UP (ref 11.5–14.5)
RBC # FLD: 13.4 % — SIGNIFICANT CHANGE UP (ref 11.5–14.5)
S PYO DNA BLD POS QL NAA+NON-PROBE: SIGNIFICANT CHANGE UP
SARS-COV-2 IGG+IGM SERPL QL IA: >250 U/ML — HIGH
SARS-COV-2 IGG+IGM SERPL QL IA: POSITIVE
SODIUM SERPL-SCNC: 136 MMOL/L — SIGNIFICANT CHANGE UP (ref 135–146)
WBC # BLD: 10.14 K/UL — SIGNIFICANT CHANGE UP (ref 4.8–10.8)
WBC # BLD: 6.77 K/UL — SIGNIFICANT CHANGE UP (ref 4.8–10.8)
WBC # FLD AUTO: 10.14 K/UL — SIGNIFICANT CHANGE UP (ref 4.8–10.8)
WBC # FLD AUTO: 6.77 K/UL — SIGNIFICANT CHANGE UP (ref 4.8–10.8)

## 2021-07-07 PROCEDURE — 11043 DBRDMT MUSC&/FSCA 1ST 20/<: CPT

## 2021-07-07 PROCEDURE — 11046 DBRDMT MUSC&/FSCA EA ADDL: CPT

## 2021-07-07 RX ORDER — PENICILLIN G POTASSIUM 5000000 [IU]/1
4 POWDER, FOR SOLUTION INTRAMUSCULAR; INTRAPLEURAL; INTRATHECAL; INTRAVENOUS EVERY 4 HOURS
Refills: 0 | Status: DISCONTINUED | OUTPATIENT
Start: 2021-07-07 | End: 2021-07-08

## 2021-07-07 RX ORDER — SODIUM CHLORIDE 9 MG/ML
1000 INJECTION, SOLUTION INTRAVENOUS
Refills: 0 | Status: DISCONTINUED | OUTPATIENT
Start: 2021-07-07 | End: 2021-07-08

## 2021-07-07 RX ORDER — PENICILLIN G POTASSIUM 5000000 [IU]/1
POWDER, FOR SOLUTION INTRAMUSCULAR; INTRAPLEURAL; INTRATHECAL; INTRAVENOUS
Refills: 0 | Status: DISCONTINUED | OUTPATIENT
Start: 2021-07-07 | End: 2021-07-08

## 2021-07-07 RX ORDER — MORPHINE SULFATE 50 MG/1
2 CAPSULE, EXTENDED RELEASE ORAL
Refills: 0 | Status: DISCONTINUED | OUTPATIENT
Start: 2021-07-07 | End: 2021-07-08

## 2021-07-07 RX ORDER — MUPIROCIN 20 MG/G
1 OINTMENT TOPICAL
Refills: 0 | Status: DISCONTINUED | OUTPATIENT
Start: 2021-07-07 | End: 2021-07-08

## 2021-07-07 RX ORDER — PENICILLIN G POTASSIUM 5000000 [IU]/1
4 POWDER, FOR SOLUTION INTRAMUSCULAR; INTRAPLEURAL; INTRATHECAL; INTRAVENOUS EVERY 4 HOURS
Refills: 0 | Status: DISCONTINUED | OUTPATIENT
Start: 2021-07-07 | End: 2021-07-07

## 2021-07-07 RX ORDER — SODIUM CHLORIDE 9 MG/ML
1000 INJECTION, SOLUTION INTRAVENOUS
Refills: 0 | Status: DISCONTINUED | OUTPATIENT
Start: 2021-07-07 | End: 2021-07-07

## 2021-07-07 RX ORDER — CHLORHEXIDINE GLUCONATE 213 G/1000ML
1 SOLUTION TOPICAL
Refills: 0 | Status: DISCONTINUED | OUTPATIENT
Start: 2021-07-07 | End: 2021-07-08

## 2021-07-07 RX ORDER — ACETAMINOPHEN 500 MG
650 TABLET ORAL EVERY 6 HOURS
Refills: 0 | Status: DISCONTINUED | OUTPATIENT
Start: 2021-07-07 | End: 2021-07-08

## 2021-07-07 RX ORDER — PENICILLIN G POTASSIUM 5000000 [IU]/1
POWDER, FOR SOLUTION INTRAMUSCULAR; INTRAPLEURAL; INTRATHECAL; INTRAVENOUS
Refills: 0 | Status: DISCONTINUED | OUTPATIENT
Start: 2021-07-07 | End: 2021-07-07

## 2021-07-07 RX ORDER — POTASSIUM CHLORIDE 20 MEQ
40 PACKET (EA) ORAL ONCE
Refills: 0 | Status: DISCONTINUED | OUTPATIENT
Start: 2021-07-07 | End: 2021-07-07

## 2021-07-07 RX ORDER — ENOXAPARIN SODIUM 100 MG/ML
40 INJECTION SUBCUTANEOUS DAILY
Refills: 0 | Status: DISCONTINUED | OUTPATIENT
Start: 2021-07-07 | End: 2021-07-08

## 2021-07-07 RX ORDER — HYDROMORPHONE HYDROCHLORIDE 2 MG/ML
0.5 INJECTION INTRAMUSCULAR; INTRAVENOUS; SUBCUTANEOUS
Refills: 0 | Status: DISCONTINUED | OUTPATIENT
Start: 2021-07-07 | End: 2021-07-07

## 2021-07-07 RX ORDER — MORPHINE SULFATE 50 MG/1
2 CAPSULE, EXTENDED RELEASE ORAL EVERY 4 HOURS
Refills: 0 | Status: DISCONTINUED | OUTPATIENT
Start: 2021-07-07 | End: 2021-07-08

## 2021-07-07 RX ORDER — PENICILLIN G POTASSIUM 5000000 [IU]/1
4 POWDER, FOR SOLUTION INTRAMUSCULAR; INTRAPLEURAL; INTRATHECAL; INTRAVENOUS ONCE
Refills: 0 | Status: DISCONTINUED | OUTPATIENT
Start: 2021-07-07 | End: 2021-07-07

## 2021-07-07 RX ORDER — HYDROMORPHONE HYDROCHLORIDE 2 MG/ML
1 INJECTION INTRAMUSCULAR; INTRAVENOUS; SUBCUTANEOUS
Refills: 0 | Status: DISCONTINUED | OUTPATIENT
Start: 2021-07-07 | End: 2021-07-07

## 2021-07-07 RX ORDER — OXYCODONE AND ACETAMINOPHEN 5; 325 MG/1; MG/1
1 TABLET ORAL EVERY 6 HOURS
Refills: 0 | Status: DISCONTINUED | OUTPATIENT
Start: 2021-07-07 | End: 2021-07-08

## 2021-07-07 RX ORDER — PENICILLIN G POTASSIUM 5000000 [IU]/1
4 POWDER, FOR SOLUTION INTRAMUSCULAR; INTRAPLEURAL; INTRATHECAL; INTRAVENOUS ONCE
Refills: 0 | Status: COMPLETED | OUTPATIENT
Start: 2021-07-07 | End: 2021-07-07

## 2021-07-07 RX ORDER — POTASSIUM CHLORIDE 20 MEQ
40 PACKET (EA) ORAL ONCE
Refills: 0 | Status: COMPLETED | OUTPATIENT
Start: 2021-07-07 | End: 2021-07-07

## 2021-07-07 RX ORDER — ONDANSETRON 8 MG/1
4 TABLET, FILM COATED ORAL ONCE
Refills: 0 | Status: DISCONTINUED | OUTPATIENT
Start: 2021-07-07 | End: 2021-07-07

## 2021-07-07 RX ADMIN — PENICILLIN G POTASSIUM 100 MILLION UNIT(S): 5000000 POWDER, FOR SOLUTION INTRAMUSCULAR; INTRAPLEURAL; INTRATHECAL; INTRAVENOUS at 18:18

## 2021-07-07 RX ADMIN — PENICILLIN G POTASSIUM 100 MILLION UNIT(S): 5000000 POWDER, FOR SOLUTION INTRAMUSCULAR; INTRAPLEURAL; INTRATHECAL; INTRAVENOUS at 22:08

## 2021-07-07 RX ADMIN — Medication 100 MILLIGRAM(S): at 22:08

## 2021-07-07 RX ADMIN — MUPIROCIN 1 APPLICATION(S): 20 OINTMENT TOPICAL at 22:08

## 2021-07-07 RX ADMIN — Medication 40 MILLIEQUIVALENT(S): at 18:16

## 2021-07-07 RX ADMIN — SODIUM CHLORIDE 75 MILLILITER(S): 9 INJECTION, SOLUTION INTRAVENOUS at 23:59

## 2021-07-07 RX ADMIN — ENOXAPARIN SODIUM 40 MILLIGRAM(S): 100 INJECTION SUBCUTANEOUS at 13:14

## 2021-07-07 RX ADMIN — Medication 100 MILLIGRAM(S): at 11:16

## 2021-07-07 RX ADMIN — CHLORHEXIDINE GLUCONATE 1 APPLICATION(S): 213 SOLUTION TOPICAL at 05:17

## 2021-07-07 RX ADMIN — PENICILLIN G POTASSIUM 100 MILLION UNIT(S): 5000000 POWDER, FOR SOLUTION INTRAMUSCULAR; INTRAPLEURAL; INTRATHECAL; INTRAVENOUS at 12:59

## 2021-07-07 RX ADMIN — Medication 250 MILLIGRAM(S): at 05:17

## 2021-07-07 NOTE — BRIEF OPERATIVE NOTE - NSICDXBRIEFPROCEDURE_GEN_ALL_CORE_FT
PROCEDURES:  Selective debridement of wound 07-Jul-2021 10:28:48 debridement and abscess drainage right arm Paul Kinney

## 2021-07-07 NOTE — CHART NOTE - NSCHARTNOTEFT_GEN_A_CORE
PACU ANESTHESIA ADMISSION NOTE      Procedure: Selective debridement of wound  debridement and abscess drainage right arm      Post op diagnosis:  Complicated abscess    __x__  Patent Airway    __x__  Full return of protective reflexes    ___x_  Full recovery from anesthesia / back to baseline     Vitals:   see anesthesia record      Mental Status:  __x__ Awake   ___x__ Alert   _____ Drowsy   _____ Sedated    Nausea/Vomiting:  __x__ NO  ______Yes,   See Post - Op Orders          Pain Scale (0-10):  _____    Treatment: ____ None    ___x_ See Post - Op/PCA Orders    Post - Operative Fluids:   ____ Oral   __x__ See Post - Op Orders    Plan: Discharge:   ____Home       ___x_Floor     _____Critical Care    _____  Other:_________________    Comments:  Uneventful intraoperative course. No anesthesia issues or complications noted. Patient stable upon arrival to PACU. Report given to RN. Discharge when criteria met.
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
## 2021-07-07 NOTE — CHART NOTE - NSCHARTNOTEFT_GEN_A_CORE
Noted blood cultures growing Group A strep    Can stop vancomycin and Start Penicillin G 4 million units q 4 hours with clindamycin 600 TID.   Repeat Blood Cultures for surveillance  Planned for debridement.     Please call or message on Microsoft Teams if with any questions.  Spectra [phone]

## 2021-07-07 NOTE — PROGRESS NOTE ADULT - SUBJECTIVE AND OBJECTIVE BOX
INTERVENTIONAL RADIOLOGY BRIEF-OPERATIVE NOTE    Procedure: midline placement    Pre-Op Diagnosis: venous access    Post-Op Diagnosis: same    Attending: Kavin  Resident: Enma    Anesthesia (type):  [ ] General Anesthesia  [ ] Sedation  [ ] Spinal Anesthesia  [ x] Local/Regional    Contrast: None    Estimated Blood Loss: Minimal, < 5 cc    Condition:   [ ] Critical  [ ] Serious  [ ] Fair   [ c] Good    Findings/Follow up Plan of Care: Successful placement of midline in left brachial vein. Pt tolerated the procedure well.     Specimens Removed: none    Implants: none    Complications: none immediate    Disposition: transfer to floor      Please call Interventional Radiology [phone] with any questions, concerns, or issues.         INTERVENTIONAL RADIOLOGY BRIEF-OPERATIVE NOTE    Procedure: Midline placement    Pre-Op Diagnosis:  Need for venous access    Post-Op Diagnosis: Same    Attending: Kavin  Resident: Enma    Anesthesia (type):  [ ] General Anesthesia  [ ] Sedation  [ ] Spinal Anesthesia  [ x] Local/Regional    Contrast: None    Estimated Blood Loss: Minimal, < 5 cc    Condition:   [ ] Critical  [ ] Serious  [ ] Fair   [x] Good    Findings/Follow up Plan of Care: Successful placement of midline in left brachial vein. Pt tolerated the procedure well.     Specimens Removed: none    Implants: none    Complications: none immediate    Disposition: transfer to floor      Please call Interventional Radiology [phone] with any questions, concerns, or issues.
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
## 2021-07-08 LAB
-  CEFTRIAXONE: SIGNIFICANT CHANGE UP
-  PENICILLIN: SIGNIFICANT CHANGE UP
-  VANCOMYCIN: SIGNIFICANT CHANGE UP
ANION GAP SERPL CALC-SCNC: 10 MMOL/L — SIGNIFICANT CHANGE UP (ref 7–14)
ANION GAP SERPL CALC-SCNC: 10 MMOL/L — SIGNIFICANT CHANGE UP (ref 7–14)
BASOPHILS # BLD AUTO: 0.04 K/UL — SIGNIFICANT CHANGE UP (ref 0–0.2)
BASOPHILS NFR BLD AUTO: 0.5 % — SIGNIFICANT CHANGE UP (ref 0–1)
BUN SERPL-MCNC: 11 MG/DL — SIGNIFICANT CHANGE UP (ref 10–20)
CALCIUM SERPL-MCNC: 9 MG/DL — SIGNIFICANT CHANGE UP (ref 8.5–10.1)
CHLORIDE SERPL-SCNC: 103 MMOL/L — SIGNIFICANT CHANGE UP (ref 98–110)
CHLORIDE SERPL-SCNC: 103 MMOL/L — SIGNIFICANT CHANGE UP (ref 98–110)
CO2 SERPL-SCNC: 24 MMOL/L — SIGNIFICANT CHANGE UP (ref 17–32)
CREAT SERPL-MCNC: 0.7 MG/DL — SIGNIFICANT CHANGE UP (ref 0.7–1.5)
CULTURE RESULTS: SIGNIFICANT CHANGE UP
EOSINOPHIL # BLD AUTO: 0.03 K/UL — SIGNIFICANT CHANGE UP (ref 0–0.7)
EOSINOPHIL NFR BLD AUTO: 0.4 % — SIGNIFICANT CHANGE UP (ref 0–8)
GLUCOSE SERPL-MCNC: 108 MG/DL — HIGH (ref 70–99)
HCG UR QL: NEGATIVE — SIGNIFICANT CHANGE UP
HCT VFR BLD CALC: 33.2 % — LOW (ref 37–47)
HGB BLD-MCNC: 10.4 G/DL — LOW (ref 12–16)
IMM GRANULOCYTES NFR BLD AUTO: 2.2 % — HIGH (ref 0.1–0.3)
LYMPHOCYTES # BLD AUTO: 1.84 K/UL — SIGNIFICANT CHANGE UP (ref 1.2–3.4)
LYMPHOCYTES # BLD AUTO: 22.9 % — SIGNIFICANT CHANGE UP (ref 20.5–51.1)
MAGNESIUM SERPL-MCNC: 1.9 MG/DL — SIGNIFICANT CHANGE UP (ref 1.8–2.4)
MCHC RBC-ENTMCNC: 28 PG — SIGNIFICANT CHANGE UP (ref 27–31)
MCHC RBC-ENTMCNC: 31.3 G/DL — LOW (ref 32–37)
MCV RBC AUTO: 89.2 FL — SIGNIFICANT CHANGE UP (ref 81–99)
METHOD TYPE: SIGNIFICANT CHANGE UP
METHOD TYPE: SIGNIFICANT CHANGE UP
MONOCYTES # BLD AUTO: 0.62 K/UL — HIGH (ref 0.1–0.6)
MONOCYTES NFR BLD AUTO: 7.7 % — SIGNIFICANT CHANGE UP (ref 1.7–9.3)
NEUTROPHILS # BLD AUTO: 5.33 K/UL — SIGNIFICANT CHANGE UP (ref 1.4–6.5)
NEUTROPHILS NFR BLD AUTO: 66.3 % — SIGNIFICANT CHANGE UP (ref 42.2–75.2)
NRBC # BLD: 0 /100 WBCS — SIGNIFICANT CHANGE UP (ref 0–0)
ORGANISM # SPEC MICROSCOPIC CNT: SIGNIFICANT CHANGE UP
PHOSPHATE SERPL-MCNC: 4.4 MG/DL — SIGNIFICANT CHANGE UP (ref 2.1–4.9)
PLATELET # BLD AUTO: 254 K/UL — SIGNIFICANT CHANGE UP (ref 130–400)
POTASSIUM SERPL-MCNC: 3.5 MMOL/L — SIGNIFICANT CHANGE UP (ref 3.5–5)
POTASSIUM SERPL-MCNC: 4.1 MMOL/L — SIGNIFICANT CHANGE UP (ref 3.5–5)
POTASSIUM SERPL-SCNC: 3.5 MMOL/L — SIGNIFICANT CHANGE UP (ref 3.5–5)
POTASSIUM SERPL-SCNC: 4.1 MMOL/L — SIGNIFICANT CHANGE UP (ref 3.5–5)
RBC # BLD: 3.72 M/UL — LOW (ref 4.2–5.4)
RBC # FLD: 13.2 % — SIGNIFICANT CHANGE UP (ref 11.5–14.5)
SODIUM SERPL-SCNC: 135 MMOL/L — SIGNIFICANT CHANGE UP (ref 135–146)
SODIUM SERPL-SCNC: 137 MMOL/L — SIGNIFICANT CHANGE UP (ref 135–146)
SPECIMEN SOURCE: SIGNIFICANT CHANGE UP
WBC # BLD: 8.04 K/UL — SIGNIFICANT CHANGE UP (ref 4.8–10.8)
WBC # FLD AUTO: 8.04 K/UL — SIGNIFICANT CHANGE UP (ref 4.8–10.8)

## 2021-07-08 PROCEDURE — 11045 DBRDMT SUBQ TISS EACH ADDL: CPT

## 2021-07-08 PROCEDURE — 11042 DBRDMT SUBQ TIS 1ST 20SQCM/<: CPT | Mod: 58

## 2021-07-08 RX ORDER — CHLORHEXIDINE GLUCONATE 213 G/1000ML
1 SOLUTION TOPICAL
Refills: 0 | Status: DISCONTINUED | OUTPATIENT
Start: 2021-07-08 | End: 2021-07-09

## 2021-07-08 RX ORDER — MUPIROCIN 20 MG/G
1 OINTMENT TOPICAL
Refills: 0 | Status: DISCONTINUED | OUTPATIENT
Start: 2021-07-08 | End: 2021-07-09

## 2021-07-08 RX ORDER — OXYCODONE AND ACETAMINOPHEN 5; 325 MG/1; MG/1
1 TABLET ORAL EVERY 6 HOURS
Refills: 0 | Status: DISCONTINUED | OUTPATIENT
Start: 2021-07-08 | End: 2021-07-09

## 2021-07-08 RX ORDER — KETOROLAC TROMETHAMINE 30 MG/ML
30 SYRINGE (ML) INJECTION EVERY 6 HOURS
Refills: 0 | Status: DISCONTINUED | OUTPATIENT
Start: 2021-07-08 | End: 2021-07-09

## 2021-07-08 RX ORDER — ENOXAPARIN SODIUM 100 MG/ML
40 INJECTION SUBCUTANEOUS DAILY
Refills: 0 | Status: DISCONTINUED | OUTPATIENT
Start: 2021-07-08 | End: 2021-07-09

## 2021-07-08 RX ORDER — PENICILLIN G POTASSIUM 5000000 [IU]/1
4 POWDER, FOR SOLUTION INTRAMUSCULAR; INTRAPLEURAL; INTRATHECAL; INTRAVENOUS EVERY 4 HOURS
Refills: 0 | Status: DISCONTINUED | OUTPATIENT
Start: 2021-07-08 | End: 2021-07-09

## 2021-07-08 RX ORDER — SODIUM CHLORIDE 9 MG/ML
1000 INJECTION, SOLUTION INTRAVENOUS
Refills: 0 | Status: DISCONTINUED | OUTPATIENT
Start: 2021-07-08 | End: 2021-07-08

## 2021-07-08 RX ORDER — PENICILLIN G POTASSIUM 5000000 [IU]/1
POWDER, FOR SOLUTION INTRAMUSCULAR; INTRAPLEURAL; INTRATHECAL; INTRAVENOUS
Refills: 0 | Status: DISCONTINUED | OUTPATIENT
Start: 2021-07-08 | End: 2021-07-09

## 2021-07-08 RX ORDER — PENICILLIN G POTASSIUM 5000000 [IU]/1
4 POWDER, FOR SOLUTION INTRAMUSCULAR; INTRAPLEURAL; INTRATHECAL; INTRAVENOUS ONCE
Refills: 0 | Status: COMPLETED | OUTPATIENT
Start: 2021-07-08 | End: 2021-07-08

## 2021-07-08 RX ORDER — FENTANYL CITRATE 50 UG/ML
25 INJECTION INTRAVENOUS
Refills: 0 | Status: DISCONTINUED | OUTPATIENT
Start: 2021-07-08 | End: 2021-07-08

## 2021-07-08 RX ORDER — HYDROMORPHONE HYDROCHLORIDE 2 MG/ML
0.5 INJECTION INTRAMUSCULAR; INTRAVENOUS; SUBCUTANEOUS
Refills: 0 | Status: DISCONTINUED | OUTPATIENT
Start: 2021-07-08 | End: 2021-07-08

## 2021-07-08 RX ORDER — ACETAMINOPHEN 500 MG
650 TABLET ORAL EVERY 6 HOURS
Refills: 0 | Status: DISCONTINUED | OUTPATIENT
Start: 2021-07-08 | End: 2021-07-09

## 2021-07-08 RX ORDER — ONDANSETRON 8 MG/1
4 TABLET, FILM COATED ORAL ONCE
Refills: 0 | Status: DISCONTINUED | OUTPATIENT
Start: 2021-07-08 | End: 2021-07-08

## 2021-07-08 RX ORDER — SODIUM CHLORIDE 9 MG/ML
1000 INJECTION, SOLUTION INTRAVENOUS
Refills: 0 | Status: DISCONTINUED | OUTPATIENT
Start: 2021-07-08 | End: 2021-07-09

## 2021-07-08 RX ORDER — MORPHINE SULFATE 50 MG/1
2 CAPSULE, EXTENDED RELEASE ORAL
Refills: 0 | Status: DISCONTINUED | OUTPATIENT
Start: 2021-07-08 | End: 2021-07-09

## 2021-07-08 RX ORDER — MORPHINE SULFATE 50 MG/1
2 CAPSULE, EXTENDED RELEASE ORAL EVERY 4 HOURS
Refills: 0 | Status: DISCONTINUED | OUTPATIENT
Start: 2021-07-08 | End: 2021-07-09

## 2021-07-08 RX ORDER — HYDROMORPHONE HYDROCHLORIDE 2 MG/ML
0.4 INJECTION INTRAMUSCULAR; INTRAVENOUS; SUBCUTANEOUS
Refills: 0 | Status: DISCONTINUED | OUTPATIENT
Start: 2021-07-08 | End: 2021-07-08

## 2021-07-08 RX ADMIN — MUPIROCIN 1 APPLICATION(S): 20 OINTMENT TOPICAL at 05:29

## 2021-07-08 RX ADMIN — CHLORHEXIDINE GLUCONATE 1 APPLICATION(S): 213 SOLUTION TOPICAL at 05:30

## 2021-07-08 RX ADMIN — PENICILLIN G POTASSIUM 100 MILLION UNIT(S): 5000000 POWDER, FOR SOLUTION INTRAMUSCULAR; INTRAPLEURAL; INTRATHECAL; INTRAVENOUS at 01:49

## 2021-07-08 RX ADMIN — Medication 30 MILLIGRAM(S): at 17:56

## 2021-07-08 RX ADMIN — PENICILLIN G POTASSIUM 100 MILLION UNIT(S): 5000000 POWDER, FOR SOLUTION INTRAMUSCULAR; INTRAPLEURAL; INTRATHECAL; INTRAVENOUS at 17:22

## 2021-07-08 RX ADMIN — Medication 100 MILLIGRAM(S): at 05:32

## 2021-07-08 RX ADMIN — MUPIROCIN 1 APPLICATION(S): 20 OINTMENT TOPICAL at 17:16

## 2021-07-08 RX ADMIN — Medication 100 MILLIGRAM(S): at 21:05

## 2021-07-08 RX ADMIN — ENOXAPARIN SODIUM 40 MILLIGRAM(S): 100 INJECTION SUBCUTANEOUS at 13:24

## 2021-07-08 RX ADMIN — SODIUM CHLORIDE 75 MILLILITER(S): 9 INJECTION, SOLUTION INTRAVENOUS at 12:31

## 2021-07-08 RX ADMIN — PENICILLIN G POTASSIUM 100 MILLION UNIT(S): 5000000 POWDER, FOR SOLUTION INTRAMUSCULAR; INTRAPLEURAL; INTRATHECAL; INTRAVENOUS at 13:24

## 2021-07-08 RX ADMIN — Medication 100 MILLIGRAM(S): at 12:30

## 2021-07-08 RX ADMIN — Medication 30 MILLIGRAM(S): at 23:13

## 2021-07-08 RX ADMIN — Medication 30 MILLIGRAM(S): at 17:28

## 2021-07-08 RX ADMIN — PENICILLIN G POTASSIUM 100 MILLION UNIT(S): 5000000 POWDER, FOR SOLUTION INTRAMUSCULAR; INTRAPLEURAL; INTRATHECAL; INTRAVENOUS at 21:05

## 2021-07-08 RX ADMIN — PENICILLIN G POTASSIUM 100 MILLION UNIT(S): 5000000 POWDER, FOR SOLUTION INTRAMUSCULAR; INTRAPLEURAL; INTRATHECAL; INTRAVENOUS at 05:41

## 2021-07-08 RX ADMIN — SODIUM CHLORIDE 75 MILLILITER(S): 9 INJECTION, SOLUTION INTRAVENOUS at 17:20

## 2021-07-08 NOTE — CHART NOTE - NSCHARTNOTEFT_GEN_A_CORE
PACU ANESTHESIA ADMISSION NOTE      Procedure: Selective debridement of wound  debridement and abscess drainage right arm      Post op diagnosis:  Complicated abscess        ____  Intubated  TV:______       Rate: ______      FiO2: ______    __X_  Patent Airway    __X_  Full return of protective reflexes    __X_  Full recovery from anesthesia / back to baseline status    Vitals:  T(C): 98.6F  HR: 70  BP: 135/83  RR: 18  SpO2: 100%    Mental Status:  _X__ Awake   ___X_ Alert   _____ Drowsy   _____ Sedated    Nausea/Vomiting:  ____ Yes, See Post - Op Orders      _X_ No    Pain Scale (0-10):  __2__    Treatment: ____ None    __X_ See Post - Op/PCA Orders    Post - Operative Fluids:   ____ Oral   _X__ See Post - Op Orders    Plan: Discharge:   ____Home       __X__Floor     _____Critical Care    _____  Other:_________________    Comments:

## 2021-07-08 NOTE — PRE-OP CHECKLIST - SELECT TESTS ORDERED
BMP/CBC/PT/PTT/INR/Type and Screen/HCG/EKG/CXR BMP/CBC/PT/PTT/INR/Type and Screen/HCG/EKG/CXR/COVID-19

## 2021-07-08 NOTE — BRIEF OPERATIVE NOTE - NSICDXBRIEFPROCEDURE_GEN_ALL_CORE_FT
PROCEDURES:  Debridement of soft tissue of upper extremity 08-Jul-2021 18:25:45 abscess site to but not including fascia Shyla Martinez  Secondary closure of wound 08-Jul-2021 18:26:05 partial right arm 6 cm LongShyla   PROCEDURES:  Debridement of soft tissue of upper extremity 08-Jul-2021 18:25:45 excisional debridement and pulsatile irrigation of abscess site right arm to but not including fascia Shyla Martinez  Secondary closure of wound 08-Jul-2021 18:26:05 partial right arm 6 cm LongShyla

## 2021-07-09 ENCOUNTER — RESULT REVIEW (OUTPATIENT)
Age: 31
End: 2021-07-09

## 2021-07-09 LAB
ANION GAP SERPL CALC-SCNC: 10 MMOL/L — SIGNIFICANT CHANGE UP (ref 7–14)
ANION GAP SERPL CALC-SCNC: 7 MMOL/L — SIGNIFICANT CHANGE UP (ref 7–14)
BUN SERPL-MCNC: 11 MG/DL — SIGNIFICANT CHANGE UP (ref 10–20)
BUN SERPL-MCNC: 12 MG/DL — SIGNIFICANT CHANGE UP (ref 10–20)
CALCIUM SERPL-MCNC: 9 MG/DL — SIGNIFICANT CHANGE UP (ref 8.5–10.1)
CALCIUM SERPL-MCNC: 9.1 MG/DL — SIGNIFICANT CHANGE UP (ref 8.5–10.1)
CHLORIDE SERPL-SCNC: 105 MMOL/L — SIGNIFICANT CHANGE UP (ref 98–110)
CHLORIDE SERPL-SCNC: 106 MMOL/L — SIGNIFICANT CHANGE UP (ref 98–110)
CO2 SERPL-SCNC: 23 MMOL/L — SIGNIFICANT CHANGE UP (ref 17–32)
CO2 SERPL-SCNC: 25 MMOL/L — SIGNIFICANT CHANGE UP (ref 17–32)
CREAT SERPL-MCNC: 0.7 MG/DL — SIGNIFICANT CHANGE UP (ref 0.7–1.5)
CREAT SERPL-MCNC: 0.7 MG/DL — SIGNIFICANT CHANGE UP (ref 0.7–1.5)
GLUCOSE SERPL-MCNC: 106 MG/DL — HIGH (ref 70–99)
GLUCOSE SERPL-MCNC: 114 MG/DL — HIGH (ref 70–99)
HCT VFR BLD CALC: 33.1 % — LOW (ref 37–47)
HGB BLD-MCNC: 10.5 G/DL — LOW (ref 12–16)
MAGNESIUM SERPL-MCNC: 1.9 MG/DL — SIGNIFICANT CHANGE UP (ref 1.8–2.4)
MAGNESIUM SERPL-MCNC: 2 MG/DL — SIGNIFICANT CHANGE UP (ref 1.8–2.4)
MCHC RBC-ENTMCNC: 29 PG — SIGNIFICANT CHANGE UP (ref 27–31)
MCHC RBC-ENTMCNC: 31.7 G/DL — LOW (ref 32–37)
MCV RBC AUTO: 91.4 FL — SIGNIFICANT CHANGE UP (ref 81–99)
NRBC # BLD: 0 /100 WBCS — SIGNIFICANT CHANGE UP (ref 0–0)
PHOSPHATE SERPL-MCNC: 3.9 MG/DL — SIGNIFICANT CHANGE UP (ref 2.1–4.9)
PLATELET # BLD AUTO: 294 K/UL — SIGNIFICANT CHANGE UP (ref 130–400)
POTASSIUM SERPL-MCNC: 4.5 MMOL/L — SIGNIFICANT CHANGE UP (ref 3.5–5)
POTASSIUM SERPL-MCNC: 4.8 MMOL/L — SIGNIFICANT CHANGE UP (ref 3.5–5)
POTASSIUM SERPL-SCNC: 4.5 MMOL/L — SIGNIFICANT CHANGE UP (ref 3.5–5)
POTASSIUM SERPL-SCNC: 4.8 MMOL/L — SIGNIFICANT CHANGE UP (ref 3.5–5)
RBC # BLD: 3.62 M/UL — LOW (ref 4.2–5.4)
RBC # FLD: 13.2 % — SIGNIFICANT CHANGE UP (ref 11.5–14.5)
SODIUM SERPL-SCNC: 136 MMOL/L — SIGNIFICANT CHANGE UP (ref 135–146)
SODIUM SERPL-SCNC: 140 MMOL/L — SIGNIFICANT CHANGE UP (ref 135–146)
WBC # BLD: 8.51 K/UL — SIGNIFICANT CHANGE UP (ref 4.8–10.8)
WBC # FLD AUTO: 8.51 K/UL — SIGNIFICANT CHANGE UP (ref 4.8–10.8)

## 2021-07-09 PROCEDURE — 88304 TISSUE EXAM BY PATHOLOGIST: CPT | Mod: 26

## 2021-07-09 PROCEDURE — 11046 DBRDMT MUSC&/FSCA EA ADDL: CPT

## 2021-07-09 PROCEDURE — 11043 DBRDMT MUSC&/FSCA 1ST 20/<: CPT | Mod: 58

## 2021-07-09 RX ORDER — CHLORHEXIDINE GLUCONATE 213 G/1000ML
1 SOLUTION TOPICAL
Refills: 0 | Status: DISCONTINUED | OUTPATIENT
Start: 2021-07-09 | End: 2021-07-12

## 2021-07-09 RX ORDER — PENICILLIN G POTASSIUM 5000000 [IU]/1
4 POWDER, FOR SOLUTION INTRAMUSCULAR; INTRAPLEURAL; INTRATHECAL; INTRAVENOUS EVERY 4 HOURS
Refills: 0 | Status: DISCONTINUED | OUTPATIENT
Start: 2021-07-09 | End: 2021-07-11

## 2021-07-09 RX ORDER — MORPHINE SULFATE 50 MG/1
2 CAPSULE, EXTENDED RELEASE ORAL
Refills: 0 | Status: DISCONTINUED | OUTPATIENT
Start: 2021-07-09 | End: 2021-07-12

## 2021-07-09 RX ORDER — PENICILLIN G POTASSIUM 5000000 [IU]/1
POWDER, FOR SOLUTION INTRAMUSCULAR; INTRAPLEURAL; INTRATHECAL; INTRAVENOUS
Refills: 0 | Status: DISCONTINUED | OUTPATIENT
Start: 2021-07-09 | End: 2021-07-11

## 2021-07-09 RX ORDER — PENICILLIN G POTASSIUM 5000000 [IU]/1
4 POWDER, FOR SOLUTION INTRAMUSCULAR; INTRAPLEURAL; INTRATHECAL; INTRAVENOUS ONCE
Refills: 0 | Status: COMPLETED | OUTPATIENT
Start: 2021-07-09 | End: 2021-07-09

## 2021-07-09 RX ORDER — MORPHINE SULFATE 50 MG/1
2 CAPSULE, EXTENDED RELEASE ORAL EVERY 4 HOURS
Refills: 0 | Status: DISCONTINUED | OUTPATIENT
Start: 2021-07-09 | End: 2021-07-11

## 2021-07-09 RX ORDER — OXYCODONE AND ACETAMINOPHEN 5; 325 MG/1; MG/1
1 TABLET ORAL EVERY 6 HOURS
Refills: 0 | Status: DISCONTINUED | OUTPATIENT
Start: 2021-07-09 | End: 2021-07-12

## 2021-07-09 RX ORDER — KETOROLAC TROMETHAMINE 30 MG/ML
30 SYRINGE (ML) INJECTION EVERY 6 HOURS
Refills: 0 | Status: DISCONTINUED | OUTPATIENT
Start: 2021-07-09 | End: 2021-07-11

## 2021-07-09 RX ORDER — MUPIROCIN 20 MG/G
1 OINTMENT TOPICAL
Refills: 0 | Status: DISCONTINUED | OUTPATIENT
Start: 2021-07-09 | End: 2021-07-12

## 2021-07-09 RX ORDER — ENOXAPARIN SODIUM 100 MG/ML
40 INJECTION SUBCUTANEOUS DAILY
Refills: 0 | Status: DISCONTINUED | OUTPATIENT
Start: 2021-07-09 | End: 2021-07-12

## 2021-07-09 RX ORDER — ACETAMINOPHEN 500 MG
650 TABLET ORAL EVERY 6 HOURS
Refills: 0 | Status: DISCONTINUED | OUTPATIENT
Start: 2021-07-09 | End: 2021-07-11

## 2021-07-09 RX ORDER — SODIUM CHLORIDE 9 MG/ML
1000 INJECTION, SOLUTION INTRAVENOUS
Refills: 0 | Status: DISCONTINUED | OUTPATIENT
Start: 2021-07-09 | End: 2021-07-11

## 2021-07-09 RX ADMIN — Medication 30 MILLIGRAM(S): at 05:02

## 2021-07-09 RX ADMIN — Medication 30 MILLIGRAM(S): at 23:17

## 2021-07-09 RX ADMIN — Medication 30 MILLIGRAM(S): at 17:53

## 2021-07-09 RX ADMIN — Medication 100 MILLIGRAM(S): at 05:01

## 2021-07-09 RX ADMIN — ENOXAPARIN SODIUM 40 MILLIGRAM(S): 100 INJECTION SUBCUTANEOUS at 11:46

## 2021-07-09 RX ADMIN — PENICILLIN G POTASSIUM 100 MILLION UNIT(S): 5000000 POWDER, FOR SOLUTION INTRAMUSCULAR; INTRAPLEURAL; INTRATHECAL; INTRAVENOUS at 21:20

## 2021-07-09 RX ADMIN — MUPIROCIN 1 APPLICATION(S): 20 OINTMENT TOPICAL at 17:53

## 2021-07-09 RX ADMIN — Medication 30 MILLIGRAM(S): at 11:48

## 2021-07-09 RX ADMIN — PENICILLIN G POTASSIUM 100 MILLION UNIT(S): 5000000 POWDER, FOR SOLUTION INTRAMUSCULAR; INTRAPLEURAL; INTRATHECAL; INTRAVENOUS at 05:02

## 2021-07-09 RX ADMIN — PENICILLIN G POTASSIUM 100 MILLION UNIT(S): 5000000 POWDER, FOR SOLUTION INTRAMUSCULAR; INTRAPLEURAL; INTRATHECAL; INTRAVENOUS at 09:41

## 2021-07-09 RX ADMIN — PENICILLIN G POTASSIUM 100 MILLION UNIT(S): 5000000 POWDER, FOR SOLUTION INTRAMUSCULAR; INTRAPLEURAL; INTRATHECAL; INTRAVENOUS at 02:00

## 2021-07-09 RX ADMIN — MUPIROCIN 1 APPLICATION(S): 20 OINTMENT TOPICAL at 05:03

## 2021-07-09 RX ADMIN — Medication 100 MILLIGRAM(S): at 15:39

## 2021-07-09 RX ADMIN — PENICILLIN G POTASSIUM 100 MILLION UNIT(S): 5000000 POWDER, FOR SOLUTION INTRAMUSCULAR; INTRAPLEURAL; INTRATHECAL; INTRAVENOUS at 15:40

## 2021-07-09 NOTE — BRIEF OPERATIVE NOTE - NSICDXBRIEFPROCEDURE_GEN_ALL_CORE_FT
PROCEDURES:  Selective debridement of wound 07-Jul-2021 10:28:48 debridement and abscess drainage right arm Paul Kinney  Selective debridement of wound 09-Jul-2021 14:10:01 debridement and closure right arm wound Paul Kinney

## 2021-07-09 NOTE — BRIEF OPERATIVE NOTE - OPERATION/FINDINGS
purulent drainage and fat necrosis
no purulent drainage, areas induration
no eugene pus; patchy devitalized skin and subcutis, decreased swelling persistent induration ; epidermal slough surrounding wound

## 2021-07-09 NOTE — BRIEF OPERATIVE NOTE - NSICDXBRIEFPREOP_GEN_ALL_CORE_FT
PRE-OP DIAGNOSIS:  Complicated abscess 07-Jul-2021 10:31:05 right arm Paul Kinney  

## 2021-07-09 NOTE — BRIEF OPERATIVE NOTE - NSICDXBRIEFPOSTOP_GEN_ALL_CORE_FT
POST-OP DIAGNOSIS:  Complicated abscess 07-Jul-2021 10:31:25  Paul Kinney  

## 2021-07-09 NOTE — CHART NOTE - NSCHARTNOTEFT_GEN_A_CORE
PACU ANESTHESIA ADMISSION NOTE      Procedure: Debridement and closure of right arm wound  Post op diagnosis:  Complicated abscess    __x__  Patent Airway    _x___  Full return of protective reflexes    __x__  Full recovery from anesthesia / back to baseline     Vitals:   T:  98.3         R: 17                 BP:  127/59                Sat: 99%                  P: 72      Mental Status:  _x___ Awake   _____ Alert   _____ Drowsy   _____ Sedated    Nausea/Vomiting:  ____ NO  ______Yes,   See Post - Op Orders          Pain Scale (0-10):  _____    Treatment: ____ None    ___x_ See Post - Op/PCA Orders    Post - Operative Fluids:   ____ Oral   __x__ See Post - Op Orders    Plan: Discharge:   ____Home       ___x__Floor     _____Critical Care    _____  Other:_________________    Comments: Pt tolerated procedure well, no anesthesia related complications. Care of pt endorsed to PACU, report given to PACU RN. Discharge when criteria are met.

## 2021-07-09 NOTE — PROGRESS NOTE ADULT - SUBJECTIVE AND OBJECTIVE BOX
PATTI BURROWS  30y, Female  Allergy: No Known Allergies      LOS  3d    CHIEF COMPLAINT: Arm pain (06 Jul 2021 15:28)      INTERVAL EVENTS/HPI  - No acute events overnight  - T(F): , Max: 98.3 (07-09-21 @ 12:12)  - Denies any worsening symptoms  - Tolerating medication  - WBC Count: 8.04 (07-08-21 @ 16:12)  WBC Count: 10.14 (07-07-21 @ 22:47)     - Creatinine, Serum: 0.7 (07-08-21 @ 16:12)  Creatinine, Serum: 0.7 (07-07-21 @ 22:47)       ROS  General: Denies rigors, nightsweats  HEENT: Denies headache, rhinorrhea, sore throat, eye pain  CV: Denies CP, palpitations  PULM: Denies wheezing, hemoptysis  GI: Denies hematemesis, hematochezia, melena  : Denies discharge, hematuria  MSK: Denies arthralgias, myalgias  SKIN: Denies rash, lesions  NEURO: Denies paresthesias, weakness  PSYCH: Denies depression, anxiety    VITALS:  T(F): 98.3, Max: 98.3 (07-09-21 @ 12:12)  HR: 59  BP: 118/58  RR: 20Vital Signs Last 24 Hrs  T(C): 36.8 (09 Jul 2021 14:00), Max: 36.8 (09 Jul 2021 12:12)  T(F): 98.3 (09 Jul 2021 14:00), Max: 98.3 (09 Jul 2021 12:12)  HR: 59 (09 Jul 2021 14:56) (59 - 70)  BP: 118/58 (09 Jul 2021 14:56) (115/64 - 156/86)  BP(mean): --  RR: 20 (09 Jul 2021 14:56) (13 - 20)  SpO2: 96% (09 Jul 2021 14:56) (96% - 100%)    PHYSICAL EXAM:  Gen: NAD, resting in bed  HEENT: Normocephalic, atraumatic  Neck: supple, no lymphadenopathy  CV: Regular rate & regular rhythm  Lungs: decreased BS at bases, no fremitus  Abdomen: Soft, BS present  Ext: Warm, well perfused  Neuro: non focal, awake  Skin: no rash, no erythema  Lines: no phlebitis    FH: Non-contributory  Social Hx: Non-contributory    TESTS & MEASUREMENTS:                        10.4   8.04  )-----------( 254      ( 08 Jul 2021 16:12 )             33.2     07-08    137  |  103  |  11  ----------------------------<  108<H>  3.5   |  24  |  0.7    Ca    9.0      08 Jul 2021 16:12  Phos  4.4     07-08  Mg     1.9     07-08      eGFR if Non African American: 117 mL/min/1.73M2 (07-08-21 @ 16:12)  eGFR if : 136 mL/min/1.73M2 (07-08-21 @ 16:12)          Culture - Tissue with Gram Stain (collected 07-07-21 @ 10:30)  Source: .Tissue None  Gram Stain (07-08-21 @ 02:17):    Rare polymorphonuclear leukocytes per low power field    Rare Gram Variable Coccobacilli per oil power field  Preliminary Report (07-08-21 @ 21:03):    Few Streptococcus pyogenes Group A    Penicillin and ampicillin are drugs of choice for    treatment of beta-hemolytic streptococcal infections.    Susceptibility testing is not performed routinely because    S. pyogenes (GAS) is universally susceptible topenicillin    and resistance in other strains is extremely rare.    Culture - Blood (collected 07-05-21 @ 21:59)  Source: .Blood Blood  Gram Stain (07-06-21 @ 22:39):    Growth in anaerobic bottle: Gram Positive Cocci in Pairs and Chains  Final Report (07-08-21 @ 16:06):    Growth in anaerobic bottle: Streptococcus pyogenes Group A    ***Blood Panel PCR results on this specimen are available    approximately 3 hours after the Gram stain result.***    Gram stain, PCR, and/or culture results may not always    correspond due to difference in methodologies.    ************************************************************    This PCR assay was performed by multiplex PCR. This    Assay tests for 66 bacterial and resistance gene targets.    Please refer to the [facility] test directory    at [email or web address] for details.  Organism: Blood Culture PCR  Streptococcus pyogenes  Streptococcus pyogenes (07-08-21 @ 16:06)  Organism: Streptococcus pyogenes (07-08-21 @ 16:06)      -  Ceftriaxone: S 0.023      -  Penicillin: S 0.016 Predicts results for ampicillin, amoxicillin, amoxicillin/clavulanate, ampicillin/sulbactam, 1st, 2nd and 3rd generation cephalosporins and carbapenems.      Method Type: ETEST  Organism: Streptococcus pyogenes (07-08-21 @ 16:06)      -  Vancomycin: S      Method Type: KB  Organism: Blood Culture PCR (07-08-21 @ 16:06)      -  Streptococcus pyogenes (Group A): Detec      Method Type: PCR    Culture - Blood (collected 07-05-21 @ 21:59)  Source: .Blood Blood  Preliminary Report (07-07-21 @ 07:02):    No growth to date.        Lactate, Blood: 1.1 mmol/L (07-05-21 @ 21:59)      INFECTIOUS DISEASES TESTING  MRSA PCR Result.: Positive (07-07-21 @ 02:00)  COVID-19 PCR: NotDetec (07-06-21 @ 02:56)      INFLAMMATORY MARKERS      RADIOLOGY & ADDITIONAL TESTS:  I have personally reviewed the last available Chest xray  CXR  Xray Chest 1 View- PORTABLE-Urgent:   EXAM:  XR CHEST PORTABLE URGENT 1V            PROCEDURE DATE:  07/06/2021            INTERPRETATION:  Clinical History / Reason for exam: Preoperative evaluation    Comparison : Chest radiograph None.    Technique/Positioning: Single AP view of thechest.    Findings:    Support devices: None.    Cardiac/mediastinum/hilum: Unremarkable.    Lung parenchyma/Pleura: No consolidation, effusion or pneumothorax.    Skeleton/soft tissues: Unremarkable.    Impression:    No radiographic evidence of acute cardiopulmonary disease.        --- End of Report ---              ELLIOT LANDAU MD; Attending Radiologist  This document has been electronically signed. Jul 7 2021  8:58AM (07-06-21 @ 17:04)      CT      CARDIOLOGY TESTING  12 Lead ECG:   Ventricular Rate 83 BPM    Atrial Rate 83 BPM    P-R Interval 150 ms    QRS Duration 90 ms    Q-T Interval 382 ms    QTC Calculation(Bazett) 448 ms    P Axis 16 degrees    R Axis 25 degrees    T Axis -3 degrees    Diagnosis Line Normal sinus rhythm  Normal ECG    Confirmed by ANGELA SIGALA MD ([id number]) on 7/6/2021 2:59:15 PM (07-06-21 @ 14:44)      MEDICATIONS  chlorhexidine 4% Liquid 1 Topical <User Schedule>  clindamycin IVPB     clindamycin IVPB 600 IV Intermittent every 8 hours  enoxaparin Injectable 40 SubCutaneous daily  ketorolac   Injectable 30 IV Push every 6 hours  lactated ringers. 1000 IV Continuous <Continuous>  mupirocin 2% Ointment 1 Topical two times a day  penicillin   G  potassium  IVPB 4 IV Intermittent every 4 hours  penicillin   G  potassium  IVPB         WEIGHT  Weight (kg): 145.8 (07-09-21 @ 12:25)  Creatinine, Serum: 0.7 mg/dL (07-08-21 @ 16:12)      ANTIBIOTICS:  clindamycin IVPB      clindamycin IVPB 600 milliGRAM(s) IV Intermittent every 8 hours  penicillin   G  potassium  IVPB 4 Million Unit(s) IV Intermittent every 4 hours  penicillin   G  potassium  IVPB          All available historical records have been reviewed

## 2021-07-09 NOTE — CDI QUERY NOTE - NSCDIOTHERTXTBX_GEN_ALL_CORE_HH
Documentation  7/6/2021 HPI: 30F w/ Purulent Cellulitis rt. arm  7/6 ID consult> Sepsis on admission secondary to severe purulent soft tissue infection  S/p Excisional debridement of necrotic skin and subcutaneous tissue  Vital Signs  7/6 Nursing Flow Sheet T 97.8-102.6  HR 80-95  RR 18-20  Laboratory Findings  7/5 WBC 13.44 auto neutrophils% 78.0  7/5 Blood Culture: Streptococcus pyogenes  Treatment;  7/5 Unasyn IVPB x1 Indication: Infection  7/6-7/7 Vancomycin IVPB Indication: Skin and soft tissue infection  7/7-7/8 PEN G Potassium  IVPB Indication: Group A strept  7/8 Clindamycin IVPB Indication: Group A strept  Based on your professional judgment and above clinical findings can the Infectious Disease diagnosis of sepsis on admission be further specified as :            -Sepsis was ruled in, evaluated and treated during this admission           -Sepsis was ruled out           -Clinically unable to determine

## 2021-07-09 NOTE — PRE-ANESTHESIA EVALUATION ADULT - NSANTHOSAYNRD_GEN_A_CORE
No. YOSHI screening performed.  STOP BANG Legend: 0-2 = LOW Risk; 3-4 = INTERMEDIATE Risk; 5-8 = HIGH Risk

## 2021-07-10 LAB
ANION GAP SERPL CALC-SCNC: 9 MMOL/L — SIGNIFICANT CHANGE UP (ref 7–14)
BUN SERPL-MCNC: 13 MG/DL — SIGNIFICANT CHANGE UP (ref 10–20)
CALCIUM SERPL-MCNC: 8.8 MG/DL — SIGNIFICANT CHANGE UP (ref 8.5–10.1)
CHLORIDE SERPL-SCNC: 105 MMOL/L — SIGNIFICANT CHANGE UP (ref 98–110)
CO2 SERPL-SCNC: 23 MMOL/L — SIGNIFICANT CHANGE UP (ref 17–32)
CREAT SERPL-MCNC: 0.8 MG/DL — SIGNIFICANT CHANGE UP (ref 0.7–1.5)
GLUCOSE SERPL-MCNC: 91 MG/DL — SIGNIFICANT CHANGE UP (ref 70–99)
MAGNESIUM SERPL-MCNC: 2 MG/DL — SIGNIFICANT CHANGE UP (ref 1.8–2.4)
PHOSPHATE SERPL-MCNC: 4.5 MG/DL — SIGNIFICANT CHANGE UP (ref 2.1–4.9)
POTASSIUM SERPL-MCNC: 4.2 MMOL/L — SIGNIFICANT CHANGE UP (ref 3.5–5)
POTASSIUM SERPL-SCNC: 4.2 MMOL/L — SIGNIFICANT CHANGE UP (ref 3.5–5)
SODIUM SERPL-SCNC: 137 MMOL/L — SIGNIFICANT CHANGE UP (ref 135–146)

## 2021-07-10 PROCEDURE — 99231 SBSQ HOSP IP/OBS SF/LOW 25: CPT

## 2021-07-10 RX ADMIN — MUPIROCIN 1 APPLICATION(S): 20 OINTMENT TOPICAL at 17:27

## 2021-07-10 RX ADMIN — MORPHINE SULFATE 2 MILLIGRAM(S): 50 CAPSULE, EXTENDED RELEASE ORAL at 09:11

## 2021-07-10 RX ADMIN — ENOXAPARIN SODIUM 40 MILLIGRAM(S): 100 INJECTION SUBCUTANEOUS at 11:23

## 2021-07-10 RX ADMIN — MUPIROCIN 1 APPLICATION(S): 20 OINTMENT TOPICAL at 05:03

## 2021-07-10 RX ADMIN — Medication 30 MILLIGRAM(S): at 05:03

## 2021-07-10 RX ADMIN — Medication 30 MILLIGRAM(S): at 11:24

## 2021-07-10 RX ADMIN — Medication 30 MILLIGRAM(S): at 12:00

## 2021-07-10 RX ADMIN — Medication 30 MILLIGRAM(S): at 23:35

## 2021-07-10 RX ADMIN — PENICILLIN G POTASSIUM 100 MILLION UNIT(S): 5000000 POWDER, FOR SOLUTION INTRAMUSCULAR; INTRAPLEURAL; INTRATHECAL; INTRAVENOUS at 02:13

## 2021-07-10 RX ADMIN — Medication 30 MILLIGRAM(S): at 17:45

## 2021-07-10 RX ADMIN — Medication 30 MILLIGRAM(S): at 23:18

## 2021-07-10 RX ADMIN — PENICILLIN G POTASSIUM 100 MILLION UNIT(S): 5000000 POWDER, FOR SOLUTION INTRAMUSCULAR; INTRAPLEURAL; INTRATHECAL; INTRAVENOUS at 21:46

## 2021-07-10 RX ADMIN — Medication 30 MILLIGRAM(S): at 17:26

## 2021-07-10 RX ADMIN — MORPHINE SULFATE 2 MILLIGRAM(S): 50 CAPSULE, EXTENDED RELEASE ORAL at 09:30

## 2021-07-10 RX ADMIN — PENICILLIN G POTASSIUM 100 MILLION UNIT(S): 5000000 POWDER, FOR SOLUTION INTRAMUSCULAR; INTRAPLEURAL; INTRATHECAL; INTRAVENOUS at 05:03

## 2021-07-10 NOTE — PROGRESS NOTE ADULT - SUBJECTIVE AND OBJECTIVE BOX
POD#1    Vital Signs Last 24 Hrs  T(C): 36.3 (10 Jul 2021 21:00), Max: 36.3 (10 Jul 2021 01:01)  T(F): 97.4 (10 Jul 2021 21:00), Max: 97.4 (10 Jul 2021 21:00)  HR: 75 (10 Jul 2021 21:00) (64 - 87)  BP: 159/68 (10 Jul 2021 21:00) (134/73 - 159/68)  BP(mean): --  RR: 18 (10 Jul 2021 21:00) (18 - 18)  SpO2: --    CVP:  T(C): 36.3 (07-10-21 @ 21:00), Max: 36.3 (07-10-21 @ 01:01)  HR: 75 (07-10-21 @ 21:00) (64 - 87)  BP: 159/68 (07-10-21 @ 21:00) (134/73 - 159/68)  RR: 18 (07-10-21 @ 21:00) (18 - 18)  SpO2: --  CVP(mm Hg): --    U.O.:  I&O's Detail    09 Jul 2021 07:01  -  10 Jul 2021 07:00  --------------------------------------------------------  IN:  Total IN: 0 mL    OUT:    Bulb (mL): 2 mL  Total OUT: 2 mL    Total NET: -2 mL      10 Jul 2021 07:01  -  10 Jul 2021 23:58  --------------------------------------------------------  IN:  Total IN: 0 mL    OUT:    Bulb (mL): 10 mL  Total OUT: 10 mL    Total NET: -10 mL                                        10.5   8.51  )-----------( 294      ( 09 Jul 2021 21:41 )             33.1     07-10    137  |  105  |  13  ----------------------------<  91  4.2   |  23  |  0.8    Ca    8.8      10 Jul 2021 17:06  Phos  4.5     07-10  Mg     2.0     07-10        Large Dressing Change--> right arm healing post debridement , mild drainage --> iv abx, local wound care

## 2021-07-11 ENCOUNTER — TRANSCRIPTION ENCOUNTER (OUTPATIENT)
Age: 31
End: 2021-07-11

## 2021-07-11 LAB
ANION GAP SERPL CALC-SCNC: 11 MMOL/L — SIGNIFICANT CHANGE UP (ref 7–14)
BUN SERPL-MCNC: 12 MG/DL — SIGNIFICANT CHANGE UP (ref 10–20)
CALCIUM SERPL-MCNC: 9 MG/DL — SIGNIFICANT CHANGE UP (ref 8.5–10.1)
CHLORIDE SERPL-SCNC: 103 MMOL/L — SIGNIFICANT CHANGE UP (ref 98–110)
CO2 SERPL-SCNC: 23 MMOL/L — SIGNIFICANT CHANGE UP (ref 17–32)
CREAT SERPL-MCNC: 0.7 MG/DL — SIGNIFICANT CHANGE UP (ref 0.7–1.5)
CULTURE RESULTS: SIGNIFICANT CHANGE UP
GLUCOSE SERPL-MCNC: 86 MG/DL — SIGNIFICANT CHANGE UP (ref 70–99)
HCT VFR BLD CALC: 35.3 % — LOW (ref 37–47)
HGB BLD-MCNC: 11.1 G/DL — LOW (ref 12–16)
MAGNESIUM SERPL-MCNC: 1.9 MG/DL — SIGNIFICANT CHANGE UP (ref 1.8–2.4)
MCHC RBC-ENTMCNC: 28.5 PG — SIGNIFICANT CHANGE UP (ref 27–31)
MCHC RBC-ENTMCNC: 31.4 G/DL — LOW (ref 32–37)
MCV RBC AUTO: 90.7 FL — SIGNIFICANT CHANGE UP (ref 81–99)
NRBC # BLD: 0 /100 WBCS — SIGNIFICANT CHANGE UP (ref 0–0)
PHOSPHATE SERPL-MCNC: 4.2 MG/DL — SIGNIFICANT CHANGE UP (ref 2.1–4.9)
PLATELET # BLD AUTO: 315 K/UL — SIGNIFICANT CHANGE UP (ref 130–400)
POTASSIUM SERPL-MCNC: 4.5 MMOL/L — SIGNIFICANT CHANGE UP (ref 3.5–5)
POTASSIUM SERPL-SCNC: 4.5 MMOL/L — SIGNIFICANT CHANGE UP (ref 3.5–5)
RBC # BLD: 3.89 M/UL — LOW (ref 4.2–5.4)
RBC # FLD: 13.2 % — SIGNIFICANT CHANGE UP (ref 11.5–14.5)
SODIUM SERPL-SCNC: 137 MMOL/L — SIGNIFICANT CHANGE UP (ref 135–146)
SPECIMEN SOURCE: SIGNIFICANT CHANGE UP
WBC # BLD: 9.33 K/UL — SIGNIFICANT CHANGE UP (ref 4.8–10.8)
WBC # FLD AUTO: 9.33 K/UL — SIGNIFICANT CHANGE UP (ref 4.8–10.8)

## 2021-07-11 PROCEDURE — 99231 SBSQ HOSP IP/OBS SF/LOW 25: CPT

## 2021-07-11 RX ORDER — IBUPROFEN 200 MG
600 TABLET ORAL EVERY 6 HOURS
Refills: 0 | Status: DISCONTINUED | OUTPATIENT
Start: 2021-07-11 | End: 2021-07-12

## 2021-07-11 RX ORDER — ACETAMINOPHEN 500 MG
650 TABLET ORAL EVERY 6 HOURS
Refills: 0 | Status: DISCONTINUED | OUTPATIENT
Start: 2021-07-11 | End: 2021-07-11

## 2021-07-11 RX ORDER — OXYCODONE AND ACETAMINOPHEN 5; 325 MG/1; MG/1
2 TABLET ORAL EVERY 6 HOURS
Refills: 0 | Status: DISCONTINUED | OUTPATIENT
Start: 2021-07-11 | End: 2021-07-12

## 2021-07-11 RX ORDER — AMOXICILLIN 250 MG/5ML
875 SUSPENSION, RECONSTITUTED, ORAL (ML) ORAL EVERY 12 HOURS
Refills: 0 | Status: DISCONTINUED | OUTPATIENT
Start: 2021-07-11 | End: 2021-07-11

## 2021-07-11 RX ADMIN — OXYCODONE AND ACETAMINOPHEN 2 TABLET(S): 5; 325 TABLET ORAL at 10:02

## 2021-07-11 RX ADMIN — OXYCODONE AND ACETAMINOPHEN 2 TABLET(S): 5; 325 TABLET ORAL at 11:00

## 2021-07-11 RX ADMIN — CHLORHEXIDINE GLUCONATE 1 APPLICATION(S): 213 SOLUTION TOPICAL at 05:41

## 2021-07-11 RX ADMIN — OXYCODONE AND ACETAMINOPHEN 2 TABLET(S): 5; 325 TABLET ORAL at 23:47

## 2021-07-11 RX ADMIN — ENOXAPARIN SODIUM 40 MILLIGRAM(S): 100 INJECTION SUBCUTANEOUS at 11:37

## 2021-07-11 RX ADMIN — PENICILLIN G POTASSIUM 100 MILLION UNIT(S): 5000000 POWDER, FOR SOLUTION INTRAMUSCULAR; INTRAPLEURAL; INTRATHECAL; INTRAVENOUS at 01:06

## 2021-07-11 RX ADMIN — MUPIROCIN 1 APPLICATION(S): 20 OINTMENT TOPICAL at 17:06

## 2021-07-11 RX ADMIN — OXYCODONE AND ACETAMINOPHEN 2 TABLET(S): 5; 325 TABLET ORAL at 23:17

## 2021-07-11 RX ADMIN — MUPIROCIN 1 APPLICATION(S): 20 OINTMENT TOPICAL at 05:41

## 2021-07-11 RX ADMIN — Medication 1 TABLET(S): at 17:06

## 2021-07-11 RX ADMIN — Medication 1 TABLET(S): at 05:41

## 2021-07-11 NOTE — DISCHARGE NOTE PROVIDER - CARE PROVIDER_API CALL
Paul Kinney)  Plastic Surgery  [address] [place] [address]  Phone: [phone]  Fax: [phone]  Follow Up Time:
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
## 2021-07-11 NOTE — PROGRESS NOTE ADULT - ATTENDING SUPERVISION STATEMENT
ACP Spine appears normal, full ROM, there is increased diameter of the left leg compare to the right, there is tenderness with squeezing of the left calf and with dorsiflexion, pulses intact 2+

## 2021-07-11 NOTE — DISCHARGE NOTE PROVIDER - NSFOLLOWUPCLINICS_GEN_ALL_ED_FT
[facility]  Burn  [address] [facility], [address]  [place] [address]  Phone: [phone]  Fax:   Follow Up Time: 1 week

## 2021-07-11 NOTE — DISCHARGE NOTE PROVIDER - NSDCFUADDAPPT_GEN_ALL_CORE_FT
Please call [phone] to make a follow up appointment within 1 week with Dr. Kinney or Dr. Martinez. Clinic is located at [address] [place] [address] on Tuesdays (2-4pm) or Thursdays (9am-1pm).
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
## 2021-07-11 NOTE — PROGRESS NOTE ADULT - SUBJECTIVE AND OBJECTIVE BOX
29 yo female with RUE cellulitis + abscess.    AM rounds     POD # 2 s/p debridement RUE + closure + JARAD drain.    Pt: no complaints  No acute events o/n    JARAD - 12cc in 24 hours  DC planning.    Vital Signs Last 24 Hrs  T(C): 36.6 (11 Jul 2021 05:16), Max: 36.6 (11 Jul 2021 05:16)  T(F): 97.9 (11 Jul 2021 05:16), Max: 97.9 (11 Jul 2021 05:16)  HR: 61 (11 Jul 2021 05:16) (61 - 87)  BP: 117/56 (11 Jul 2021 05:16) (117/56 - 159/68)  RR: 18 (11 Jul 2021 05:16) (18 - 18)          I&O's Summary    10 Jul 2021 07:01  -  11 Jul 2021 07:00  --------------------------------------------------------  IN: 0 mL / OUT: 12 mL / NET: -12 mL        07-10    137  |  105  |  13  ----------------------------<  91  4.2   |  23  |  0.8    Ca    8.8      10 Jul 2021 17:06  Phos  4.5     07-10  Mg     2.0     07-10                            10.5   8.51  )-----------( 294      ( 09 Jul 2021 21:41 )             33.1     Culture - Blood in AM (07.09.21 @ 21:41)    Specimen Source: .Blood None    Culture Results:   No growth to date.      Culture - Abscess with Gram Stain (07.09.21 @ 12:54)    Specimen Source: .Abscess None    Culture Results:   No growth      Culture - Surgical Swab (07.08.21 @ 11:15)    Specimen Source: .Surgical Swab None    Culture Results:   Rare Streptococcus pyogenes Group A  Penicillin and ampicillin are drugs of choice for  treatment of beta-hemolytic streptococcal infections.  Susceptibility testing is not performed routinely because  S. pyogenes (GAS) is universally susceptible to penicillin  and resistance in other strains is extremely rare.    < from: CT Humerus w/ IV Cont, Right (07.06.21 @ 16:43) >  IMPRESSION:    Diffuse soft tissue edema with overlying skin thickening along the posteriolateral to posteromedial right upper extremity most pronounced around the elbow joint likely reflecting cellulitis. No CT evidence of abscess.        < end of copied text >        EXAM:   General: NAD, lying in bed comfortably  Neuro: AAO x 3   Resp: on RA breathing comfortably, equal chest rise and fall bilaterally  Wound : RUE closure site is clean, intact, staples in place. Full ROM. No erythema, no purulent drainage, no bleeding.         29 yo female with RUE cellulitis + abscess.    AM rounds     POD # 2 s/p debridement RUE + closure + JARAD drain.    Pt: no complaints  No acute events o/n    JARAD - 12cc in 24 hours  DC planning.    Vital Signs Last 24 Hrs  T(C): 36.6 (11 Jul 2021 05:16), Max: 36.6 (11 Jul 2021 05:16)  T(F): 97.9 (11 Jul 2021 05:16), Max: 97.9 (11 Jul 2021 05:16)  HR: 61 (11 Jul 2021 05:16) (61 - 87)  BP: 117/56 (11 Jul 2021 05:16) (117/56 - 159/68)  RR: 18 (11 Jul 2021 05:16) (18 - 18)          I&O's Summary    10 Jul 2021 07:01  -  11 Jul 2021 07:00  --------------------------------------------------------  IN: 0 mL / OUT: 12 mL / NET: -12 mL        07-10    137  |  105  |  13  ----------------------------<  91  4.2   |  23  |  0.8    Ca    8.8      10 Jul 2021 17:06  Phos  4.5     07-10  Mg     2.0     07-10                            10.5   8.51  )-----------( 294      ( 09 Jul 2021 21:41 )             33.1     Culture - Blood in AM (07.09.21 @ 21:41)    Specimen Source: .Blood None    Culture Results:   No growth to date.      Culture - Abscess with Gram Stain (07.09.21 @ 12:54)    Specimen Source: .Abscess None    Culture Results:   No growth      Culture - Surgical Swab (07.08.21 @ 11:15)    Specimen Source: .Surgical Swab None    Culture Results:   Rare Streptococcus pyogenes Group A  Penicillin and ampicillin are drugs of choice for  treatment of beta-hemolytic streptococcal infections.  Susceptibility testing is not performed routinely because  S. pyogenes (GAS) is universally susceptible to penicillin  and resistance in other strains is extremely rare.    < from: CT Humerus w/ IV Cont, Right (07.06.21 @ 16:43) >  IMPRESSION:    Diffuse soft tissue edema with overlying skin thickening along the posteriolateral to posteromedial right upper extremity most pronounced around the elbow joint likely reflecting cellulitis. No CT evidence of abscess.        < end of copied text >        EXAM:   General: NAD, lying in bed comfortably  Neuro: AAO x 3   Resp: on RA breathing comfortably, equal chest rise and fall bilaterally  Wound : RUE closure site is clean, intact, staples in place. Full ROM. No erythema, no purulent drainage, no bleeding.  JARAD in place with small amount of serosanguinous drainage.

## 2021-07-11 NOTE — DISCHARGE NOTE PROVIDER - NSDCCPCAREPLAN_GEN_ALL_CORE_FT
PRINCIPAL DISCHARGE DIAGNOSIS  Diagnosis: Cellulitis and abscess  Assessment and Plan of Treatment: Continue wound care at home: wash wound with soap and water, apply xeroform dressing, then wrap with Kerlix, and ACE wrap> Continue antibiotics as prescribe. Please, follow up in [facility] in one week after discharge.       PRINCIPAL DISCHARGE DIAGNOSIS  Diagnosis: Cellulitis and abscess  Assessment and Plan of Treatment: Continue wound care at home: wash wound with soap and water, apply xeroform dressing, then wrap with Kerlix, and ACE wrap> Continue antibiotics as prescribe. Please, follow up in [facility] on Thursday 7/15. Call to make an appointment.

## 2021-07-12 ENCOUNTER — TRANSCRIPTION ENCOUNTER (OUTPATIENT)
Age: 31
End: 2021-07-12

## 2021-07-12 VITALS
TEMPERATURE: 97 F | DIASTOLIC BLOOD PRESSURE: 82 MMHG | HEART RATE: 63 BPM | SYSTOLIC BLOOD PRESSURE: 107 MMHG | RESPIRATION RATE: 16 BRPM

## 2021-07-12 PROBLEM — D68.62 LUPUS ANTICOAGULANT SYNDROME: Chronic | Status: ACTIVE | Noted: 2021-07-06

## 2021-07-12 PROCEDURE — 99238 HOSP IP/OBS DSCHRG MGMT 30/<: CPT

## 2021-07-12 RX ORDER — IBUPROFEN 200 MG
1 TABLET ORAL
Qty: 28 | Refills: 0
Start: 2021-07-12 | End: 2021-07-18

## 2021-07-12 RX ADMIN — Medication 600 MILLIGRAM(S): at 11:54

## 2021-07-12 RX ADMIN — Medication 600 MILLIGRAM(S): at 08:40

## 2021-07-12 RX ADMIN — ENOXAPARIN SODIUM 40 MILLIGRAM(S): 100 INJECTION SUBCUTANEOUS at 12:40

## 2021-07-12 RX ADMIN — CHLORHEXIDINE GLUCONATE 1 APPLICATION(S): 213 SOLUTION TOPICAL at 05:30

## 2021-07-12 RX ADMIN — Medication 1 TABLET(S): at 05:30

## 2021-07-12 RX ADMIN — MUPIROCIN 1 APPLICATION(S): 20 OINTMENT TOPICAL at 05:30

## 2021-07-12 NOTE — CHART NOTE - NSCHARTNOTEFT_GEN_A_CORE
I am unable to determine clinically if sepsis was present on admission. Pt appears to have criteria c/w sepsis- reported fever and elevated wbc - count on admission to the hospital.   A likely source of infection- right arm - was noted at the time of my evaluation and confirmed at surgery the following day -7/7/2021-when an abscess was found  .
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
## 2021-07-12 NOTE — PROGRESS NOTE ADULT - SUBJECTIVE AND OBJECTIVE BOX
AM rounds   POD # 3 s/p debridement RUE + closure + JARAD drain.    Pt: no complaints, patient is happy she was able to shower overnight.   No acute events o/n  JARAD - 7cc in 24 hours    Vital Signs Last 24 Hrs  T(C): 36.3 (12 Jul 2021 05:22), Max: 36.4 (11 Jul 2021 13:26)  T(F): 97.4 (12 Jul 2021 05:22), Max: 97.5 (11 Jul 2021 13:26)  HR: 63 (12 Jul 2021 05:22) (63 - 68)  BP: 107/82 (12 Jul 2021 05:22) (107/82 - 148/71)  RR: 16 (12 Jul 2021 05:22) (16 - 18)  SpO2: 98% (11 Jul 2021 21:13) (98% - 98%)                07-11    137  |  103  |  12  ----------------------------<  86  4.5   |  23  |  0.7    Ca    9.0      11 Jul 2021 16:59  Phos  4.2     07-11  Mg     1.9     07-11                            11.1   9.33  )-----------( 315      ( 11 Jul 2021 16:59 )             35.3     Culture - Blood in AM (07.10.21 @ 06:30)    Specimen Source: .Blood Blood-Peripheral    Culture Results:   No growth to date.    Culture - Blood in AM (07.09.21 @ 21:41)    Specimen Source: .Blood None    Culture Results:   No growth to date.          EXAM:   General: NAD, lying in bed comfortably  Neuro: AAO x 3   Resp: on RA breathing comfortably, equal chest rise and fall bilaterally  Wound : RUE closure site is clean, intact, staples in place. Full ROM. No erythema, no purulent drainage, no bleeding.    JARAD drain removed.

## 2021-07-12 NOTE — DISCHARGE NOTE NURSING/CASE MANAGEMENT/SOCIAL WORK - NSDCFUADDAPPT_GEN_ALL_CORE_FT
Please call [phone] to make a follow up appointment within 1 week with Dr. Kinney or Dr. Martinez. Clinic is located at [address] [place] [address] on Tuesdays (2-4pm) or Thursdays (9am-1pm).

## 2021-07-12 NOTE — PROGRESS NOTE ADULT - TIME BILLING
I have personally seen and examined this patient.    I have reviewed all pertinent clinical information and reviewed all relevant imaging and diagnostic studies personally.   I counseled the patient about diagnostic testing and treatment plan. All questions were answered.   I discussed recommendations with the primary team.
[address]
wound care
wound care

## 2021-07-12 NOTE — DISCHARGE NOTE NURSING/CASE MANAGEMENT/SOCIAL WORK - PATIENT PORTAL LINK FT
You can access the FollowMyHealth Patient Portal offered by [facility] by registering at the following website: [email or web address]. By joining [facility]’s FollowMyHealth portal, you will also be able to view your health information using other applications (apps) compatible with our system.

## 2021-07-14 LAB — SURGICAL PATHOLOGY STUDY: SIGNIFICANT CHANGE UP

## 2021-07-15 ENCOUNTER — APPOINTMENT (OUTPATIENT)
Dept: BURN CARE | Facility: CLINIC | Age: 31
End: 2021-07-15
Payer: MEDICAID

## 2021-07-15 ENCOUNTER — OUTPATIENT (OUTPATIENT)
Dept: OUTPATIENT SERVICES | Facility: HOSPITAL | Age: 31
LOS: 1 days | Discharge: HOME | End: 2021-07-15

## 2021-07-15 LAB
CULTURE RESULTS: SIGNIFICANT CHANGE UP
CULTURE RESULTS: SIGNIFICANT CHANGE UP
SPECIMEN SOURCE: SIGNIFICANT CHANGE UP
SPECIMEN SOURCE: SIGNIFICANT CHANGE UP

## 2021-07-15 PROCEDURE — 99213 OFFICE O/P EST LOW 20 MIN: CPT

## 2021-07-15 RX ORDER — OXYCODONE AND ACETAMINOPHEN 5; 325 MG/1; MG/1
5-325 TABLET ORAL
Qty: 30 | Refills: 0 | Status: ACTIVE | COMMUNITY
Start: 2021-07-15 | End: 1900-01-01

## 2021-07-22 ENCOUNTER — OUTPATIENT (OUTPATIENT)
Dept: OUTPATIENT SERVICES | Facility: HOSPITAL | Age: 31
LOS: 1 days | Discharge: HOME | End: 2021-07-22

## 2021-07-22 ENCOUNTER — APPOINTMENT (OUTPATIENT)
Dept: BURN CARE | Facility: CLINIC | Age: 31
End: 2021-07-22
Payer: MEDICAID

## 2021-07-22 DIAGNOSIS — Z48.02 ENCOUNTER FOR REMOVAL OF SUTURES: ICD-10-CM

## 2021-07-22 DIAGNOSIS — L02.413 CUTANEOUS ABSCESS OF RIGHT UPPER LIMB: ICD-10-CM

## 2021-07-22 DIAGNOSIS — Z98.890 OTHER SPECIFIED POSTPROCEDURAL STATES: ICD-10-CM

## 2021-07-22 PROCEDURE — 99212 OFFICE O/P EST SF 10 MIN: CPT

## 2021-07-23 NOTE — H&P ADULT - ASSESSMENT
30F w/ PMHx of SLE in remission presents to the ED c/o Pain and erythema on the RUE for the past 4 days.    #Purulent Cellulitis  -RUE w/ edema+erythema along w/ pus drainage  -s/p Unasyn in ED  -bedside scan by ED did not show drainable abscess  -No free air on Xray  Plan  - MRSA coverage w/ Vancomycin  - CT w/ con to assess for abscess    #SLE  -Dx age 17. First 4 years had active disease with joint pain and skin manifestations. Was on Plaquenil for 4 years.   -Since age 21 disease has been in remission. Occasionally gets joint pain or skin outbreaks. Reports that last pregnancy had only mild joint pain  -currently not on meds    DVT PPx Lovenox  GI PPx N/A  Diet Regular  Code Full
no

## 2021-07-25 PROBLEM — Z98.890 S/P DEBRIDEMENT: Status: ACTIVE | Noted: 2021-07-25

## 2021-07-25 PROBLEM — L02.413 ABSCESS OF ARM, RIGHT: Status: ACTIVE | Noted: 2021-07-15

## 2021-07-25 PROBLEM — Z48.02 ENCOUNTER FOR STAPLE REMOVAL: Status: ACTIVE | Noted: 2021-07-25
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
## 2021-07-25 NOTE — ASSESSMENT
[Wound Care] : wound care [FreeTextEntry1] : Right arm abscess s/p debridement and closure\par Staples removed today\par Healing\par \par Plan\par C/w local wound care.\par Wash with soap and water. Apply hydrogel, Xeroform, wrap with dry dressing daily.

## 2021-07-25 NOTE — PHYSICAL EXAM
[Healing] : healing [Size%: ______] : Size: [unfilled]% [0] : 0 out of 10 [Normal] : normal [Medium] : medium [Infected?] : Infected: No [] : no [de-identified] : Right upper arm primary closure with multiple staples --> staples removed, patient tolerated well. \par Closure is mostly clean and intact, with ~2x1cm area of pink granulation tissue, and  tiny area of yellow exudate/ devitalized tissue [TWNoteComboBox1] : xeroform [TWNoteComboBox2] : Hydragel

## 2021-07-29 ENCOUNTER — OUTPATIENT (OUTPATIENT)
Dept: OUTPATIENT SERVICES | Facility: HOSPITAL | Age: 31
LOS: 1 days | Discharge: HOME | End: 2021-07-29

## 2021-07-29 ENCOUNTER — APPOINTMENT (OUTPATIENT)
Dept: BURN CARE | Facility: CLINIC | Age: 31
End: 2021-07-29
Payer: MEDICAID

## 2021-07-29 PROCEDURE — 99213 OFFICE O/P EST LOW 20 MIN: CPT

## 2021-08-12 ENCOUNTER — TRANSCRIPTION ENCOUNTER (OUTPATIENT)
Age: 31
End: 2021-08-12

## 2021-08-12 ENCOUNTER — APPOINTMENT (OUTPATIENT)
Dept: BURN CARE | Facility: CLINIC | Age: 31
End: 2021-08-12
Payer: MEDICAID

## 2021-08-12 ENCOUNTER — OUTPATIENT (OUTPATIENT)
Dept: OUTPATIENT SERVICES | Facility: HOSPITAL | Age: 31
LOS: 1 days | Discharge: HOME | End: 2021-08-12

## 2021-08-12 PROCEDURE — 99213 OFFICE O/P EST LOW 20 MIN: CPT

## 2022-05-18 NOTE — PRE-ANESTHESIA EVALUATION ADULT - NSANTHSUBSTSD_GEN_ALL_CORE
Goals:  - Learn to eat protein first at every meal and include protein in every snack    - Start to decrease portion sizes  - Limit eating out at restaurants  - Start to increase fruits, vegetables, and whole grains in the diet and decrease desserts/ candy/ high fat and processed foods   - Decrease caffeinated and/or carbonated beverages (16oz of coffee allowed per day)  - Practice the 30/60 minute rule  Stop drinking 30 minutes prior to your meal, with you meal, and for 60 minutes after your meal   - Drink at least 64oz of water or other calorie free beverage per day  - Practice sipping beverages slowly  - Practice chewing your foods thoroughly-- to liquid consistency-- before swallowing   - Decrease/limit alcohol intake  - Keep a food journal   - Start taking a general multivitamin   - You should exercise for at least 30 minutes, 5 days a week 
No
on weekends/Yes
smokes marijuana/Yes
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
## 2023-12-12 NOTE — PRE-OP CHECKLIST - BMI (KG/M2)
56.8
Abdomen is soft, nontender, nondistended , no rebound or guarding. No organomegaly appreciated
58.8

## 2024-08-20 PROBLEM — Z00.00 ENCOUNTER FOR PREVENTIVE HEALTH EXAMINATION: Status: ACTIVE | Noted: 2024-08-20

## 2024-08-28 ENCOUNTER — APPOINTMENT (OUTPATIENT)
Dept: SURGERY | Facility: CLINIC | Age: 34
End: 2024-08-28
Payer: COMMERCIAL

## 2024-08-28 VITALS
DIASTOLIC BLOOD PRESSURE: 80 MMHG | WEIGHT: 293 LBS | BODY MASS INDEX: 53.92 KG/M2 | SYSTOLIC BLOOD PRESSURE: 124 MMHG | HEIGHT: 62 IN

## 2024-08-28 DIAGNOSIS — F12.91 CANNABIS USE, UNSPECIFIED, IN REMISSION: ICD-10-CM

## 2024-08-28 DIAGNOSIS — M32.9 SYSTEMIC LUPUS ERYTHEMATOSUS, UNSPECIFIED: ICD-10-CM

## 2024-08-28 DIAGNOSIS — E66.01 MORBID (SEVERE) OBESITY DUE TO EXCESS CALORIES: ICD-10-CM

## 2024-08-28 PROCEDURE — 99204 OFFICE O/P NEW MOD 45 MIN: CPT

## 2024-09-02 PROBLEM — E66.01 CLASS 3 OBESITY: Status: ACTIVE | Noted: 2024-08-27

## 2024-09-02 PROBLEM — M32.9 LUPUS: Status: RESOLVED | Noted: 2024-09-02 | Resolved: 2024-09-02

## 2024-09-02 PROBLEM — F12.91 HISTORY OF MARIJUANA USE: Status: ACTIVE | Noted: 2024-09-02

## 2024-10-07 ENCOUNTER — APPOINTMENT (OUTPATIENT)
Dept: GASTROENTEROLOGY | Facility: CLINIC | Age: 34
End: 2024-10-07

## 2024-10-07 DIAGNOSIS — E66.813 OBESITY, CLASS 3: ICD-10-CM

## 2024-10-07 PROCEDURE — 99204 OFFICE O/P NEW MOD 45 MIN: CPT

## 2024-11-05 ENCOUNTER — APPOINTMENT (OUTPATIENT)
Dept: SURGERY | Facility: CLINIC | Age: 34
End: 2024-11-05

## 2024-11-05 VITALS — HEIGHT: 62 IN | WEIGHT: 293 LBS | BODY MASS INDEX: 53.92 KG/M2

## 2024-11-05 PROCEDURE — 97803 MED NUTRITION INDIV SUBSEQ: CPT

## 2024-11-07 ENCOUNTER — RESULT REVIEW (OUTPATIENT)
Age: 34
End: 2024-11-07

## 2024-11-07 ENCOUNTER — OUTPATIENT (OUTPATIENT)
Dept: OUTPATIENT SERVICES | Facility: HOSPITAL | Age: 34
LOS: 1 days | Discharge: HOME | End: 2024-11-07
Payer: COMMERCIAL

## 2024-11-07 VITALS
HEART RATE: 73 BPM | RESPIRATION RATE: 18 BRPM | SYSTOLIC BLOOD PRESSURE: 132 MMHG | OXYGEN SATURATION: 100 % | DIASTOLIC BLOOD PRESSURE: 80 MMHG

## 2024-11-07 VITALS
HEIGHT: 62 IN | DIASTOLIC BLOOD PRESSURE: 72 MMHG | WEIGHT: 293 LBS | OXYGEN SATURATION: 99 % | RESPIRATION RATE: 18 BRPM | HEART RATE: 74 BPM | TEMPERATURE: 98 F | SYSTOLIC BLOOD PRESSURE: 140 MMHG

## 2024-11-07 DIAGNOSIS — E66.813 OBESITY, CLASS 3: ICD-10-CM

## 2024-11-07 PROCEDURE — 88305 TISSUE EXAM BY PATHOLOGIST: CPT

## 2024-11-07 PROCEDURE — 88312 SPECIAL STAINS GROUP 1: CPT | Mod: 26

## 2024-11-07 PROCEDURE — 88313 SPECIAL STAINS GROUP 2: CPT | Mod: 26

## 2024-11-07 PROCEDURE — 88313 SPECIAL STAINS GROUP 2: CPT

## 2024-11-07 PROCEDURE — 81025 URINE PREGNANCY TEST: CPT

## 2024-11-07 PROCEDURE — 88305 TISSUE EXAM BY PATHOLOGIST: CPT | Mod: 26

## 2024-11-07 PROCEDURE — 43239 EGD BIOPSY SINGLE/MULTIPLE: CPT

## 2024-11-07 PROCEDURE — 88312 SPECIAL STAINS GROUP 1: CPT

## 2024-11-07 NOTE — CHART NOTE - NSCHARTNOTEFT_GEN_A_CORE
PACU ANESTHESIA ADMISSION NOTE      Procedure:   Post op diagnosis:      ____  Intubated  TV:______       Rate: ______      FiO2: ______    _x___  Patent Airway    _x___  Full return of protective reflexes    _x___  Full recovery from anesthesia / back to baseline status    Vitals  SPO2:-99  HR:-98  RR:-11  B.P:-123/88  TEMP:-98    Mental Status:  _x___ Awake   ___x_ Alert   _____ Drowsy   _____ Sedated    Nausea/Vomiting:  _x___  NO       ______Yes,   See Post - Op Orders         Pain Scale (0-10):  __0___    Treatment: _x___ None    __x__ See Post - Op/PCA Orders    Post - Operative Fluids:   ___ Oral   ____x See Post - Op Orders    Plan: Discharge:   ____Home       ___x__Floor     _____Critical Care    _____  Other:_________________    Comments:  Report endorsed to RN in pacu  Vitals stable  No anesthesia issues or complications noted.  Discharge to patient to  home when criteria met.

## 2024-11-07 NOTE — H&P PST ADULT - IN ACCORDANCE WITH [place] STATE LAW, WE OFFER EVERY PATIENT A HEPATITIS C TEST. WOULD YOU LIKE TO BE TESTED TODAY?
Patient vitals have been completed and charted.  Medications and allergies were reviewed with patient.     Opt out

## 2024-11-07 NOTE — ASU DISCHARGE PLAN (ADULT/PEDIATRIC) - CARE PROVIDER_API CALL
Юлия Edwards  Gastroenterology  [address] [place] [address]  Phone: [phone]  Fax: [phone]  Follow Up Time:

## 2024-11-07 NOTE — ASU DISCHARGE PLAN (ADULT/PEDIATRIC) - NS MD DC FALL RISK RISK
For information on Fall & Injury Prevention, visit: [email or web address] OR  [email or web address] OR  https://www.cdc.gov/steadi/patient.html

## 2024-11-07 NOTE — ASU DISCHARGE PLAN (ADULT/PEDIATRIC) - FINANCIAL ASSISTANCE
[facility] provides services at a reduced cost to those who are determined to be eligible through [facility]’s financial assistance program. Information regarding [facility]’s financial assistance program can be found by going to [email or web address] or by calling [phone].

## 2024-11-11 LAB — SURGICAL PATHOLOGY STUDY: SIGNIFICANT CHANGE UP

## 2024-11-13 DIAGNOSIS — E66.01 MORBID (SEVERE) OBESITY DUE TO EXCESS CALORIES: ICD-10-CM

## 2024-11-13 DIAGNOSIS — K29.50 UNSPECIFIED CHRONIC GASTRITIS WITHOUT BLEEDING: ICD-10-CM

## 2024-11-13 DIAGNOSIS — K22.70 BARRETT'S ESOPHAGUS WITHOUT DYSPLASIA: ICD-10-CM

## 2024-11-13 DIAGNOSIS — K44.9 DIAPHRAGMATIC HERNIA WITHOUT OBSTRUCTION OR GANGRENE: ICD-10-CM

## 2024-11-25 DIAGNOSIS — A04.8 OTHER SPECIFIED BACTERIAL INTESTINAL INFECTIONS: ICD-10-CM

## 2024-11-27 RX ORDER — BISMUTH SUBCITRATE POTASSIUM, METRONIDAZOLE, AND TETRACYCLINE HYDROCHLORIDE 140; 125; 125 MG/1; MG/1; MG/1
140-125-125 CAPSULE ORAL
Qty: 120 | Refills: 0 | Status: ACTIVE | COMMUNITY
Start: 2024-11-25 | End: 1900-01-01

## 2024-11-27 RX ORDER — OMEPRAZOLE 40 MG/1
40 CAPSULE, DELAYED RELEASE ORAL
Qty: 20 | Refills: 0 | Status: ACTIVE | COMMUNITY
Start: 2024-11-25 | End: 1900-01-01

## 2024-12-03 RX ORDER — METRONIDAZOLE 500 MG/1
500 TABLET ORAL EVERY 6 HOURS
Qty: 56 | Refills: 0 | Status: ACTIVE | COMMUNITY
Start: 2024-12-03 | End: 1900-01-01

## 2024-12-03 RX ORDER — BISMUTH SUBSALICYLATE 262 MG/1
262 TABLET, CHEWABLE ORAL EVERY 6 HOURS
Qty: 112 | Refills: 0 | Status: ACTIVE | COMMUNITY
Start: 2024-12-03 | End: 1900-01-01

## 2024-12-03 RX ORDER — TETRACYCLINE HYDROCHLORIDE 500 MG/1
500 CAPSULE ORAL EVERY 6 HOURS
Qty: 56 | Refills: 0 | Status: ACTIVE | COMMUNITY
Start: 2024-12-03 | End: 1900-01-01

## 2025-02-11 ENCOUNTER — APPOINTMENT (OUTPATIENT)
Dept: SURGERY | Facility: CLINIC | Age: 35
End: 2025-02-11
Payer: COMMERCIAL

## 2025-02-11 VITALS — BODY MASS INDEX: 53.92 KG/M2 | WEIGHT: 293 LBS | TEMPERATURE: 97 F | HEIGHT: 62 IN

## 2025-02-11 PROCEDURE — 97803 MED NUTRITION INDIV SUBSEQ: CPT

## 2025-02-12 ENCOUNTER — TRANSCRIPTION ENCOUNTER (OUTPATIENT)
Age: 35
End: 2025-02-12

## 2025-03-05 ENCOUNTER — NON-APPOINTMENT (OUTPATIENT)
Age: 35
End: 2025-03-05

## 2025-03-14 ENCOUNTER — APPOINTMENT (OUTPATIENT)
Dept: SURGERY | Facility: CLINIC | Age: 35
End: 2025-03-14
Payer: COMMERCIAL

## 2025-03-14 VITALS — WEIGHT: 293 LBS | HEIGHT: 62 IN | BODY MASS INDEX: 53.92 KG/M2

## 2025-03-14 PROCEDURE — 97803 MED NUTRITION INDIV SUBSEQ: CPT

## 2025-06-02 ENCOUNTER — APPOINTMENT (OUTPATIENT)
Dept: SURGERY | Facility: CLINIC | Age: 35
End: 2025-06-02
Payer: COMMERCIAL

## 2025-06-02 VITALS — WEIGHT: 293 LBS | BODY MASS INDEX: 53.92 KG/M2 | HEIGHT: 62 IN | TEMPERATURE: 97 F

## 2025-06-02 PROCEDURE — 97803 MED NUTRITION INDIV SUBSEQ: CPT

## 2025-08-29 DIAGNOSIS — E55.9 VITAMIN D DEFICIENCY, UNSPECIFIED: ICD-10-CM

## 2025-08-29 RX ORDER — ERGOCALCIFEROL 1.25 MG/1
1.25 MG CAPSULE, LIQUID FILLED ORAL
Qty: 16 | Refills: 0 | Status: ACTIVE | COMMUNITY
Start: 2025-08-29 | End: 1900-01-01